# Patient Record
Sex: FEMALE | Race: OTHER | HISPANIC OR LATINO | Employment: UNEMPLOYED | URBAN - METROPOLITAN AREA
[De-identification: names, ages, dates, MRNs, and addresses within clinical notes are randomized per-mention and may not be internally consistent; named-entity substitution may affect disease eponyms.]

---

## 2017-07-17 ENCOUNTER — HOSPITAL ENCOUNTER (EMERGENCY)
Facility: HOSPITAL | Age: 47
End: 2017-07-18
Attending: EMERGENCY MEDICINE | Admitting: EMERGENCY MEDICINE
Payer: COMMERCIAL

## 2017-07-17 DIAGNOSIS — R45.851 SUICIDAL IDEATION: Primary | ICD-10-CM

## 2017-07-17 LAB — ETHANOL EXG-MCNC: 0 MG/DL

## 2017-07-17 PROCEDURE — 82075 ASSAY OF BREATH ETHANOL: CPT | Performed by: EMERGENCY MEDICINE

## 2017-07-17 PROCEDURE — 80307 DRUG TEST PRSMV CHEM ANLYZR: CPT | Performed by: EMERGENCY MEDICINE

## 2017-07-17 RX ORDER — NICOTINE 21 MG/24HR
21 PATCH, TRANSDERMAL 24 HOURS TRANSDERMAL ONCE
Status: DISCONTINUED | OUTPATIENT
Start: 2017-07-17 | End: 2017-07-18 | Stop reason: HOSPADM

## 2017-07-17 RX ADMIN — NICOTINE 21 MG: 21 PATCH, EXTENDED RELEASE TRANSDERMAL at 20:58

## 2017-07-18 VITALS
HEIGHT: 63 IN | DIASTOLIC BLOOD PRESSURE: 79 MMHG | BODY MASS INDEX: 24.8 KG/M2 | RESPIRATION RATE: 16 BRPM | HEART RATE: 62 BPM | TEMPERATURE: 98.5 F | OXYGEN SATURATION: 100 % | WEIGHT: 140 LBS | SYSTOLIC BLOOD PRESSURE: 122 MMHG

## 2017-07-18 LAB
AMPHETAMINES SERPL QL SCN: NEGATIVE
BARBITURATES UR QL: NEGATIVE
BENZODIAZ UR QL: NEGATIVE
COCAINE UR QL: NEGATIVE
HCG UR QL: NEGATIVE
METHADONE UR QL: NEGATIVE
OPIATES UR QL SCN: POSITIVE
PCP UR QL: NEGATIVE
THC UR QL: NEGATIVE

## 2017-07-18 PROCEDURE — 81025 URINE PREGNANCY TEST: CPT | Performed by: EMERGENCY MEDICINE

## 2017-07-18 PROCEDURE — 99285 EMERGENCY DEPT VISIT HI MDM: CPT

## 2018-01-01 ENCOUNTER — APPOINTMENT (EMERGENCY)
Dept: CT IMAGING | Facility: HOSPITAL | Age: 48
DRG: 816 | End: 2018-01-01
Payer: COMMERCIAL

## 2018-01-01 ENCOUNTER — APPOINTMENT (INPATIENT)
Dept: CT IMAGING | Facility: HOSPITAL | Age: 48
DRG: 816 | End: 2018-01-01
Payer: COMMERCIAL

## 2018-01-01 ENCOUNTER — APPOINTMENT (EMERGENCY)
Dept: RADIOLOGY | Facility: HOSPITAL | Age: 48
DRG: 816 | End: 2018-01-01
Payer: COMMERCIAL

## 2018-01-01 ENCOUNTER — APPOINTMENT (INPATIENT)
Dept: RADIOLOGY | Facility: HOSPITAL | Age: 48
DRG: 816 | End: 2018-01-01
Payer: COMMERCIAL

## 2018-01-01 ENCOUNTER — HOSPITAL ENCOUNTER (INPATIENT)
Facility: HOSPITAL | Age: 48
LOS: 10 days | Discharge: HOME | DRG: 753 | End: 2018-05-14
Attending: EMERGENCY MEDICINE | Admitting: PSYCHIATRY & NEUROLOGY
Payer: COMMERCIAL

## 2018-01-01 ENCOUNTER — HOSPITAL ENCOUNTER (INPATIENT)
Facility: HOSPITAL | Age: 48
LOS: 1 days | DRG: 816 | End: 2018-11-26
Attending: EMERGENCY MEDICINE | Admitting: INTERNAL MEDICINE
Payer: COMMERCIAL

## 2018-01-01 ENCOUNTER — APPOINTMENT (INPATIENT)
Dept: NEUROLOGY | Facility: HOSPITAL | Age: 48
DRG: 816 | End: 2018-01-01
Payer: COMMERCIAL

## 2018-01-01 VITALS
WEIGHT: 135 LBS | OXYGEN SATURATION: 96 % | RESPIRATION RATE: 16 BRPM | HEART RATE: 94 BPM | HEIGHT: 63 IN | TEMPERATURE: 98.1 F | DIASTOLIC BLOOD PRESSURE: 70 MMHG | SYSTOLIC BLOOD PRESSURE: 111 MMHG | BODY MASS INDEX: 23.92 KG/M2

## 2018-01-01 DIAGNOSIS — F32.9 MAJOR DEPRESSIVE DISORDER: Primary | ICD-10-CM

## 2018-01-01 DIAGNOSIS — K58.9 IBS (IRRITABLE BOWEL SYNDROME): ICD-10-CM

## 2018-01-01 DIAGNOSIS — F11.20 OPIOID TYPE DEPENDENCE, CONTINUOUS (HCC): Chronic | ICD-10-CM

## 2018-01-01 DIAGNOSIS — F41.9 ANXIETY: ICD-10-CM

## 2018-01-01 DIAGNOSIS — J30.9 ALLERGIC RHINITIS: ICD-10-CM

## 2018-01-01 DIAGNOSIS — F17.200 NICOTINE DEPENDENCE: ICD-10-CM

## 2018-01-01 DIAGNOSIS — D69.6 THROMBOCYTOPENIA (HCC): ICD-10-CM

## 2018-01-01 DIAGNOSIS — T50.901A DRUG OVERDOSE: ICD-10-CM

## 2018-01-01 DIAGNOSIS — B18.2 CHRONIC HEPATITIS C WITHOUT HEPATIC COMA (HCC): ICD-10-CM

## 2018-01-01 DIAGNOSIS — J96.01 ACUTE HYPOXEMIC RESPIRATORY FAILURE (HCC): ICD-10-CM

## 2018-01-01 DIAGNOSIS — R79.89 LOW T4: ICD-10-CM

## 2018-01-01 DIAGNOSIS — M62.838 MUSCLE SPASM: ICD-10-CM

## 2018-01-01 DIAGNOSIS — I46.9 CARDIAC ARREST (HCC): Primary | ICD-10-CM

## 2018-01-01 DIAGNOSIS — N17.9 AKI (ACUTE KIDNEY INJURY) (HCC): ICD-10-CM

## 2018-01-01 DIAGNOSIS — F31.63 BIPOLAR I DISORDER, MOST RECENT EPISODE MIXED, SEVERE WITHOUT PSYCHOTIC FEATURES (HCC): Primary | Chronic | ICD-10-CM

## 2018-01-01 DIAGNOSIS — F32.9 MAJOR DEPRESSIVE DISORDER: ICD-10-CM

## 2018-01-01 DIAGNOSIS — I10 HYPERTENSION: ICD-10-CM

## 2018-01-01 DIAGNOSIS — R45.851 SUICIDAL IDEATIONS: ICD-10-CM

## 2018-01-01 DIAGNOSIS — G47.00 INSOMNIA: ICD-10-CM

## 2018-01-01 LAB
ALBUMIN SERPL BCP-MCNC: 3 G/DL (ref 3.5–5)
ALBUMIN SERPL BCP-MCNC: 3.2 G/DL (ref 3.5–5)
ALP SERPL-CCNC: 81 U/L (ref 46–116)
ALP SERPL-CCNC: 89 U/L (ref 46–116)
ALT SERPL W P-5'-P-CCNC: 43 U/L (ref 12–78)
ALT SERPL W P-5'-P-CCNC: 51 U/L (ref 12–78)
AMPHETAMINES SERPL QL SCN: NEGATIVE
AMPHETAMINES SERPL QL SCN: NEGATIVE
ANION GAP BLD CALC-SCNC: 18 MMOL/L (ref 4–13)
ANION GAP SERPL CALCULATED.3IONS-SCNC: 14 MMOL/L (ref 4–13)
ANION GAP SERPL CALCULATED.3IONS-SCNC: 18 MMOL/L (ref 4–13)
ANION GAP SERPL CALCULATED.3IONS-SCNC: 3 MMOL/L (ref 4–13)
APAP SERPL-MCNC: <2 UG/ML (ref 10–30)
ARTERIAL PATENCY WRIST A: YES
AST SERPL W P-5'-P-CCNC: 39 U/L (ref 5–45)
AST SERPL W P-5'-P-CCNC: 41 U/L (ref 5–45)
BARBITURATES UR QL: NEGATIVE
BARBITURATES UR QL: NEGATIVE
BASE EXCESS BLDA CALC-SCNC: -12.3 MMOL/L
BASOPHILS # BLD AUTO: 0.01 THOUSANDS/ΜL (ref 0–0.1)
BASOPHILS # BLD AUTO: 0.01 THOUSANDS/ΜL (ref 0–0.1)
BASOPHILS # BLD AUTO: 0.02 THOUSANDS/ΜL (ref 0–0.1)
BASOPHILS # BLD AUTO: 0.06 THOUSANDS/ΜL (ref 0–0.1)
BASOPHILS NFR BLD AUTO: 0 % (ref 0–1)
BENZODIAZ UR QL: NEGATIVE
BENZODIAZ UR QL: NEGATIVE
BILIRUB SERPL-MCNC: 0.18 MG/DL (ref 0.2–1)
BILIRUB SERPL-MCNC: 0.21 MG/DL (ref 0.2–1)
BUN BLD-MCNC: 12 MG/DL (ref 5–25)
BUN SERPL-MCNC: 12 MG/DL (ref 5–25)
BUN SERPL-MCNC: 17 MG/DL (ref 5–25)
BUN SERPL-MCNC: 20 MG/DL (ref 5–25)
CA-I BLD-SCNC: 0.94 MMOL/L (ref 1.12–1.32)
CALCIUM SERPL-MCNC: 8.5 MG/DL (ref 8.3–10.1)
CALCIUM SERPL-MCNC: 8.5 MG/DL (ref 8.3–10.1)
CALCIUM SERPL-MCNC: 8.7 MG/DL (ref 8.3–10.1)
CHLORIDE BLD-SCNC: 110 MMOL/L (ref 100–108)
CHLORIDE SERPL-SCNC: 104 MMOL/L (ref 100–108)
CHOLEST SERPL-MCNC: 162 MG/DL (ref 50–200)
CO2 SERPL-SCNC: 22 MMOL/L (ref 21–32)
CO2 SERPL-SCNC: 23 MMOL/L (ref 21–32)
CO2 SERPL-SCNC: 30 MMOL/L (ref 21–32)
COCAINE UR QL: NEGATIVE
COCAINE UR QL: NEGATIVE
CREAT BLD-MCNC: 0.9 MG/DL (ref 0.6–1.3)
CREAT SERPL-MCNC: 1.03 MG/DL (ref 0.6–1.3)
CREAT SERPL-MCNC: 1.11 MG/DL (ref 0.6–1.3)
CREAT SERPL-MCNC: 1.48 MG/DL (ref 0.6–1.3)
EOSINOPHIL # BLD AUTO: 0 THOUSAND/ΜL (ref 0–0.61)
EOSINOPHIL # BLD AUTO: 0.08 THOUSAND/ΜL (ref 0–0.61)
EOSINOPHIL # BLD AUTO: 0.11 THOUSAND/ΜL (ref 0–0.61)
EOSINOPHIL # BLD AUTO: 0.13 THOUSAND/ΜL (ref 0–0.61)
EOSINOPHIL NFR BLD AUTO: 0 % (ref 0–6)
EOSINOPHIL NFR BLD AUTO: 1 % (ref 0–6)
EOSINOPHIL NFR BLD AUTO: 2 % (ref 0–6)
EOSINOPHIL NFR BLD AUTO: 3 % (ref 0–6)
ERYTHROCYTE [DISTWIDTH] IN BLOOD BY AUTOMATED COUNT: 12.4 % (ref 11.6–15.1)
ERYTHROCYTE [DISTWIDTH] IN BLOOD BY AUTOMATED COUNT: 13 % (ref 11.6–15.1)
ERYTHROCYTE [DISTWIDTH] IN BLOOD BY AUTOMATED COUNT: 13.2 % (ref 11.6–15.1)
ERYTHROCYTE [DISTWIDTH] IN BLOOD BY AUTOMATED COUNT: 13.4 % (ref 11.6–15.1)
EST. AVERAGE GLUCOSE BLD GHB EST-MCNC: 103 MG/DL
ETHANOL EXG-MCNC: 0 MG/DL
ETHANOL SERPL-MCNC: <3 MG/DL (ref 0–3)
EXT PREG TEST URINE: NORMAL
GFR SERPL CREATININE-BSD FRML MDRD: 20 ML/MIN/1.73SQ M
GFR SERPL CREATININE-BSD FRML MDRD: 37 ML/MIN/1.73SQ M
GFR SERPL CREATININE-BSD FRML MDRD: 59 ML/MIN/1.73SQ M
GFR SERPL CREATININE-BSD FRML MDRD: 64 ML/MIN/1.73SQ M
GLUCOSE P FAST SERPL-MCNC: 89 MG/DL (ref 65–99)
GLUCOSE SERPL-MCNC: 108 MG/DL (ref 65–140)
GLUCOSE SERPL-MCNC: 146 MG/DL (ref 65–140)
GLUCOSE SERPL-MCNC: 162 MG/DL (ref 65–140)
GLUCOSE SERPL-MCNC: 162 MG/DL (ref 65–140)
GLUCOSE SERPL-MCNC: 166 MG/DL (ref 65–140)
GLUCOSE SERPL-MCNC: 167 MG/DL (ref 65–140)
GLUCOSE SERPL-MCNC: 176 MG/DL (ref 65–140)
GLUCOSE SERPL-MCNC: 212 MG/DL (ref 65–140)
GLUCOSE SERPL-MCNC: 250 MG/DL (ref 65–140)
GLUCOSE SERPL-MCNC: 264 MG/DL (ref 65–140)
GLUCOSE SERPL-MCNC: 271 MG/DL (ref 65–140)
HBA1C MFR BLD: 5.2 % (ref 4.2–6.3)
HCG SERPL QL: NEGATIVE
HCO3 BLDA-SCNC: 15.8 MMOL/L (ref 22–28)
HCT VFR BLD AUTO: 37.2 % (ref 34.8–46.1)
HCT VFR BLD AUTO: 41.4 % (ref 34.8–46.1)
HCT VFR BLD AUTO: 41.9 % (ref 34.8–46.1)
HCT VFR BLD AUTO: 43 % (ref 34.8–46.1)
HCT VFR BLD CALC: 34 % (ref 34.8–46.1)
HDLC SERPL-MCNC: 61 MG/DL (ref 40–60)
HGB BLD-MCNC: 10.8 G/DL (ref 11.5–15.4)
HGB BLD-MCNC: 13.4 G/DL (ref 11.5–15.4)
HGB BLD-MCNC: 13.9 G/DL (ref 11.5–15.4)
HGB BLD-MCNC: 14.4 G/DL (ref 11.5–15.4)
HGB BLDA-MCNC: 11.6 G/DL (ref 11.5–15.4)
HOROWITZ INDEX BLDA+IHG-RTO: 100 MM[HG]
IMM GRANULOCYTES # BLD AUTO: 0.03 THOUSAND/UL (ref 0–0.2)
IMM GRANULOCYTES # BLD AUTO: 0.15 THOUSAND/UL (ref 0–0.2)
IMM GRANULOCYTES NFR BLD AUTO: 0 % (ref 0–2)
IMM GRANULOCYTES NFR BLD AUTO: 1 % (ref 0–2)
LACTATE SERPL-SCNC: 6.7 MMOL/L (ref 0.5–2)
LDLC SERPL CALC-MCNC: 64 MG/DL (ref 0–100)
LYMPHOCYTES # BLD AUTO: 1.8 THOUSANDS/ΜL (ref 0.6–4.47)
LYMPHOCYTES # BLD AUTO: 1.9 THOUSANDS/ΜL (ref 0.6–4.47)
LYMPHOCYTES # BLD AUTO: 2.79 THOUSANDS/ΜL (ref 0.6–4.47)
LYMPHOCYTES # BLD AUTO: 4.82 THOUSANDS/ΜL (ref 0.6–4.47)
LYMPHOCYTES NFR BLD AUTO: 11 % (ref 14–44)
LYMPHOCYTES NFR BLD AUTO: 44 % (ref 14–44)
LYMPHOCYTES NFR BLD AUTO: 46 % (ref 14–44)
LYMPHOCYTES NFR BLD AUTO: 51 % (ref 14–44)
MCH RBC QN AUTO: 28.3 PG (ref 26.8–34.3)
MCH RBC QN AUTO: 28.7 PG (ref 26.8–34.3)
MCH RBC QN AUTO: 29.4 PG (ref 26.8–34.3)
MCH RBC QN AUTO: 29.7 PG (ref 26.8–34.3)
MCHC RBC AUTO-ENTMCNC: 29 G/DL (ref 31.4–37.4)
MCHC RBC AUTO-ENTMCNC: 32.4 G/DL (ref 31.4–37.4)
MCHC RBC AUTO-ENTMCNC: 33.2 G/DL (ref 31.4–37.4)
MCHC RBC AUTO-ENTMCNC: 33.5 G/DL (ref 31.4–37.4)
MCV RBC AUTO: 86 FL (ref 82–98)
MCV RBC AUTO: 90 FL (ref 82–98)
MCV RBC AUTO: 91 FL (ref 82–98)
MCV RBC AUTO: 98 FL (ref 82–98)
METHADONE UR QL: NEGATIVE
METHADONE UR QL: POSITIVE
MONOCYTES # BLD AUTO: 0.25 THOUSAND/ΜL (ref 0.17–1.22)
MONOCYTES # BLD AUTO: 0.33 THOUSAND/ΜL (ref 0.17–1.22)
MONOCYTES # BLD AUTO: 0.53 THOUSAND/ΜL (ref 0.17–1.22)
MONOCYTES # BLD AUTO: 0.81 THOUSAND/ΜL (ref 0.17–1.22)
MONOCYTES NFR BLD AUTO: 3 % (ref 4–12)
MONOCYTES NFR BLD AUTO: 6 % (ref 4–12)
MONOCYTES NFR BLD AUTO: 6 % (ref 4–12)
MONOCYTES NFR BLD AUTO: 8 % (ref 4–12)
NEUTROPHILS # BLD AUTO: 1.84 THOUSANDS/ΜL (ref 1.85–7.62)
NEUTROPHILS # BLD AUTO: 1.89 THOUSANDS/ΜL (ref 1.85–7.62)
NEUTROPHILS # BLD AUTO: 22.13 THOUSANDS/ΜL (ref 1.85–7.62)
NEUTROPHILS # BLD AUTO: 3.92 THOUSANDS/ΜL (ref 1.85–7.62)
NEUTS SEG NFR BLD AUTO: 42 % (ref 43–75)
NEUTS SEG NFR BLD AUTO: 45 % (ref 43–75)
NEUTS SEG NFR BLD AUTO: 46 % (ref 43–75)
NEUTS SEG NFR BLD AUTO: 85 % (ref 43–75)
NONHDLC SERPL-MCNC: 101 MG/DL
NRBC BLD AUTO-RTO: 0 /100 WBCS
O2 CT BLDA-SCNC: 16.4 ML/DL (ref 16–23)
OPIATES UR QL SCN: NEGATIVE
OPIATES UR QL SCN: POSITIVE
OXYHGB MFR BLDA: 95.3 % (ref 94–97)
PCO2 BLD: 18 MMOL/L (ref 21–32)
PCO2 BLDA: 44.5 MM HG (ref 36–44)
PCP UR QL: NEGATIVE
PCP UR QL: NEGATIVE
PEEP RESPIRATORY: 5 CM[H2O]
PH BLDA: 7.17 [PH] (ref 7.35–7.45)
PLATELET # BLD AUTO: 112 THOUSANDS/UL (ref 149–390)
PLATELET # BLD AUTO: 126 THOUSANDS/UL (ref 149–390)
PLATELET # BLD AUTO: 175 THOUSANDS/UL (ref 149–390)
PLATELET # BLD AUTO: 283 THOUSANDS/UL (ref 149–390)
PLATELET # BLD AUTO: 287 THOUSANDS/UL (ref 149–390)
PMV BLD AUTO: 11.4 FL (ref 8.9–12.7)
PMV BLD AUTO: 11.6 FL (ref 8.9–12.7)
PMV BLD AUTO: 11.8 FL (ref 8.9–12.7)
PMV BLD AUTO: 12 FL (ref 8.9–12.7)
PMV BLD AUTO: 12.1 FL (ref 8.9–12.7)
PO2 BLDA: 275.7 MM HG (ref 75–129)
POTASSIUM BLD-SCNC: 5.9 MMOL/L (ref 3.5–5.3)
POTASSIUM SERPL-SCNC: 2.6 MMOL/L (ref 3.5–5.3)
POTASSIUM SERPL-SCNC: 4.1 MMOL/L (ref 3.5–5.3)
POTASSIUM SERPL-SCNC: 4.3 MMOL/L (ref 3.5–5.3)
PROCALCITONIN SERPL-MCNC: 17.5 NG/ML
PROT SERPL-MCNC: 6.2 G/DL (ref 6.4–8.2)
PROT SERPL-MCNC: 7.3 G/DL (ref 6.4–8.2)
RBC # BLD AUTO: 3.81 MILLION/UL (ref 3.81–5.12)
RBC # BLD AUTO: 4.56 MILLION/UL (ref 3.81–5.12)
RBC # BLD AUTO: 4.68 MILLION/UL (ref 3.81–5.12)
RBC # BLD AUTO: 5.02 MILLION/UL (ref 3.81–5.12)
RPR SER QL: NORMAL
SALICYLATES SERPL-MCNC: 3.2 MG/DL (ref 3–20)
SODIUM BLD-SCNC: 139 MMOL/L (ref 136–145)
SODIUM SERPL-SCNC: 137 MMOL/L (ref 136–145)
SODIUM SERPL-SCNC: 141 MMOL/L (ref 136–145)
SODIUM SERPL-SCNC: 144 MMOL/L (ref 136–145)
SPECIMEN SOURCE: ABNORMAL
SPECIMEN SOURCE: ABNORMAL
T3FREE SERPL-MCNC: 1.98 PG/ML (ref 2.3–4.2)
T3FREE SERPL-MCNC: 2.28 PG/ML (ref 2.3–4.2)
T4 FREE SERPL-MCNC: 0.66 NG/DL (ref 0.76–1.46)
T4 FREE SERPL-MCNC: 0.7 NG/DL (ref 0.76–1.46)
T4 FREE SERPL-MCNC: 1.51 NG/DL (ref 0.76–1.46)
THC UR QL: NEGATIVE
THC UR QL: NEGATIVE
TRIGL SERPL-MCNC: 187 MG/DL
TROPONIN I SERPL-MCNC: <0.02 NG/ML
TSH SERPL DL<=0.05 MIU/L-ACNC: 0.28 UIU/ML (ref 0.36–3.74)
TSH SERPL DL<=0.05 MIU/L-ACNC: 0.3 UIU/ML (ref 0.36–3.74)
TSH SERPL DL<=0.05 MIU/L-ACNC: 1.06 UIU/ML (ref 0.36–3.74)
TSH SERPL DL<=0.05 MIU/L-ACNC: 1.69 UIU/ML (ref 0.36–3.74)
VENT AC: 22
VENT- AC: AC
VT SETTING VENT: 450 ML
WBC # BLD AUTO: 25.94 THOUSAND/UL (ref 4.31–10.16)
WBC # BLD AUTO: 4.09 THOUSAND/UL (ref 4.31–10.16)
WBC # BLD AUTO: 4.13 THOUSAND/UL (ref 4.31–10.16)
WBC # BLD AUTO: 9.45 THOUSAND/UL (ref 4.31–10.16)

## 2018-01-01 PROCEDURE — 84443 ASSAY THYROID STIM HORMONE: CPT | Performed by: NURSE PRACTITIONER

## 2018-01-01 PROCEDURE — 80047 BASIC METABLC PNL IONIZED CA: CPT

## 2018-01-01 PROCEDURE — 99232 SBSQ HOSP IP/OBS MODERATE 35: CPT | Performed by: PSYCHIATRY & NEUROLOGY

## 2018-01-01 PROCEDURE — 84439 ASSAY OF FREE THYROXINE: CPT | Performed by: PHYSICIAN ASSISTANT

## 2018-01-01 PROCEDURE — 70450 CT HEAD/BRAIN W/O DYE: CPT

## 2018-01-01 PROCEDURE — 85014 HEMATOCRIT: CPT

## 2018-01-01 PROCEDURE — 84481 FREE ASSAY (FT-3): CPT | Performed by: PSYCHIATRY & NEUROLOGY

## 2018-01-01 PROCEDURE — 85025 COMPLETE CBC W/AUTO DIFF WBC: CPT | Performed by: NURSE PRACTITIONER

## 2018-01-01 PROCEDURE — 71045 X-RAY EXAM CHEST 1 VIEW: CPT

## 2018-01-01 PROCEDURE — 82948 REAGENT STRIP/BLOOD GLUCOSE: CPT

## 2018-01-01 PROCEDURE — 93005 ELECTROCARDIOGRAM TRACING: CPT

## 2018-01-01 PROCEDURE — 80307 DRUG TEST PRSMV CHEM ANLYZR: CPT | Performed by: EMERGENCY MEDICINE

## 2018-01-01 PROCEDURE — 80048 BASIC METABOLIC PNL TOTAL CA: CPT | Performed by: NURSE PRACTITIONER

## 2018-01-01 PROCEDURE — 99233 SBSQ HOSP IP/OBS HIGH 50: CPT | Performed by: PSYCHIATRY & NEUROLOGY

## 2018-01-01 PROCEDURE — 80061 LIPID PANEL: CPT | Performed by: PSYCHIATRY & NEUROLOGY

## 2018-01-01 PROCEDURE — 85025 COMPLETE CBC W/AUTO DIFF WBC: CPT | Performed by: PSYCHIATRY & NEUROLOGY

## 2018-01-01 PROCEDURE — 83036 HEMOGLOBIN GLYCOSYLATED A1C: CPT | Performed by: NURSE PRACTITIONER

## 2018-01-01 PROCEDURE — 85049 AUTOMATED PLATELET COUNT: CPT | Performed by: NURSE PRACTITIONER

## 2018-01-01 PROCEDURE — 87040 BLOOD CULTURE FOR BACTERIA: CPT | Performed by: NURSE PRACTITIONER

## 2018-01-01 PROCEDURE — 80320 DRUG SCREEN QUANTALCOHOLS: CPT | Performed by: EMERGENCY MEDICINE

## 2018-01-01 PROCEDURE — 82075 ASSAY OF BREATH ETHANOL: CPT | Performed by: EMERGENCY MEDICINE

## 2018-01-01 PROCEDURE — 99238 HOSP IP/OBS DSCHRG MGMT 30/<: CPT

## 2018-01-01 PROCEDURE — 80053 COMPREHEN METABOLIC PANEL: CPT | Performed by: EMERGENCY MEDICINE

## 2018-01-01 PROCEDURE — 99239 HOSP IP/OBS DSCHRG MGMT >30: CPT | Performed by: PSYCHIATRY & NEUROLOGY

## 2018-01-01 PROCEDURE — 84439 ASSAY OF FREE THYROXINE: CPT | Performed by: PSYCHIATRY & NEUROLOGY

## 2018-01-01 PROCEDURE — 96365 THER/PROPH/DIAG IV INF INIT: CPT

## 2018-01-01 PROCEDURE — 95822 EEG COMA OR SLEEP ONLY: CPT | Performed by: PSYCHIATRY & NEUROLOGY

## 2018-01-01 PROCEDURE — 94002 VENT MGMT INPAT INIT DAY: CPT

## 2018-01-01 PROCEDURE — 96375 TX/PRO/DX INJ NEW DRUG ADDON: CPT

## 2018-01-01 PROCEDURE — 84145 PROCALCITONIN (PCT): CPT | Performed by: NURSE PRACTITIONER

## 2018-01-01 PROCEDURE — 82805 BLOOD GASES W/O2 SATURATION: CPT | Performed by: EMERGENCY MEDICINE

## 2018-01-01 PROCEDURE — 02HV33Z INSERTION OF INFUSION DEVICE INTO SUPERIOR VENA CAVA, PERCUTANEOUS APPROACH: ICD-10-PCS | Performed by: EMERGENCY MEDICINE

## 2018-01-01 PROCEDURE — 83605 ASSAY OF LACTIC ACID: CPT | Performed by: NURSE PRACTITIONER

## 2018-01-01 PROCEDURE — 99291 CRITICAL CARE FIRST HOUR: CPT

## 2018-01-01 PROCEDURE — 99223 1ST HOSP IP/OBS HIGH 75: CPT | Performed by: PSYCHIATRY & NEUROLOGY

## 2018-01-01 PROCEDURE — 80053 COMPREHEN METABOLIC PANEL: CPT | Performed by: PSYCHIATRY & NEUROLOGY

## 2018-01-01 PROCEDURE — 94003 VENT MGMT INPAT SUBQ DAY: CPT

## 2018-01-01 PROCEDURE — 86592 SYPHILIS TEST NON-TREP QUAL: CPT | Performed by: PSYCHIATRY & NEUROLOGY

## 2018-01-01 PROCEDURE — 99356 PR PROLONGED SVC I/P OR OBS SETTING 1ST HOUR: CPT | Performed by: PSYCHIATRY & NEUROLOGY

## 2018-01-01 PROCEDURE — 84443 ASSAY THYROID STIM HORMONE: CPT | Performed by: PSYCHIATRY & NEUROLOGY

## 2018-01-01 PROCEDURE — 99285 EMERGENCY DEPT VISIT HI MDM: CPT

## 2018-01-01 PROCEDURE — 81002 URINALYSIS NONAUTO W/O SCOPE: CPT | Performed by: EMERGENCY MEDICINE

## 2018-01-01 PROCEDURE — 5A1935Z RESPIRATORY VENTILATION, LESS THAN 24 CONSECUTIVE HOURS: ICD-10-PCS | Performed by: EMERGENCY MEDICINE

## 2018-01-01 PROCEDURE — 92950 HEART/LUNG RESUSCITATION CPR: CPT

## 2018-01-01 PROCEDURE — 84481 FREE ASSAY (FT-3): CPT | Performed by: PHYSICIAN ASSISTANT

## 2018-01-01 PROCEDURE — 84703 CHORIONIC GONADOTROPIN ASSAY: CPT | Performed by: PSYCHIATRY & NEUROLOGY

## 2018-01-01 PROCEDURE — 84439 ASSAY OF FREE THYROXINE: CPT | Performed by: NURSE PRACTITIONER

## 2018-01-01 PROCEDURE — 81025 URINE PREGNANCY TEST: CPT | Performed by: EMERGENCY MEDICINE

## 2018-01-01 PROCEDURE — 84443 ASSAY THYROID STIM HORMONE: CPT | Performed by: PHYSICIAN ASSISTANT

## 2018-01-01 PROCEDURE — 84484 ASSAY OF TROPONIN QUANT: CPT | Performed by: EMERGENCY MEDICINE

## 2018-01-01 PROCEDURE — 85025 COMPLETE CBC W/AUTO DIFF WBC: CPT | Performed by: EMERGENCY MEDICINE

## 2018-01-01 PROCEDURE — 99252 IP/OBS CONSLTJ NEW/EST SF 35: CPT | Performed by: PHYSICIAN ASSISTANT

## 2018-01-01 PROCEDURE — 36415 COLL VENOUS BLD VENIPUNCTURE: CPT | Performed by: EMERGENCY MEDICINE

## 2018-01-01 PROCEDURE — 82947 ASSAY GLUCOSE BLOOD QUANT: CPT | Performed by: PSYCHIATRY & NEUROLOGY

## 2018-01-01 PROCEDURE — 80329 ANALGESICS NON-OPIOID 1 OR 2: CPT | Performed by: EMERGENCY MEDICINE

## 2018-01-01 PROCEDURE — 99231 SBSQ HOSP IP/OBS SF/LOW 25: CPT | Performed by: PSYCHIATRY & NEUROLOGY

## 2018-01-01 PROCEDURE — 99291 CRITICAL CARE FIRST HOUR: CPT | Performed by: INTERNAL MEDICINE

## 2018-01-01 PROCEDURE — 95816 EEG AWAKE AND DROWSY: CPT

## 2018-01-01 RX ORDER — METHADONE HYDROCHLORIDE 10 MG/1
10 TABLET ORAL DAILY
Status: COMPLETED | OUTPATIENT
Start: 2018-01-01 | End: 2018-01-01

## 2018-01-01 RX ORDER — ACETAMINOPHEN 160 MG/5ML
650 SUSPENSION, ORAL (FINAL DOSE FORM) ORAL EVERY 6 HOURS PRN
Status: DISCONTINUED | OUTPATIENT
Start: 2018-01-01 | End: 2018-01-01

## 2018-01-01 RX ORDER — LAMOTRIGINE 25 MG/1
25 TABLET ORAL DAILY
Status: DISCONTINUED | OUTPATIENT
Start: 2018-01-01 | End: 2018-01-01

## 2018-01-01 RX ORDER — HEPARIN SODIUM 5000 [USP'U]/ML
5000 INJECTION, SOLUTION INTRAVENOUS; SUBCUTANEOUS EVERY 8 HOURS SCHEDULED
Status: DISCONTINUED | OUTPATIENT
Start: 2018-01-01 | End: 2018-01-01 | Stop reason: ALTCHOICE

## 2018-01-01 RX ORDER — NICOTINE 21 MG/24HR
1 PATCH, TRANSDERMAL 24 HOURS TRANSDERMAL DAILY
Qty: 21 PATCH | Refills: 0 | Status: SHIPPED | OUTPATIENT
Start: 2018-01-01 | End: 2018-01-01

## 2018-01-01 RX ORDER — NICOTINE 21 MG/24HR
21 PATCH, TRANSDERMAL 24 HOURS TRANSDERMAL DAILY
Status: DISCONTINUED | OUTPATIENT
Start: 2018-01-01 | End: 2018-01-01 | Stop reason: HOSPADM

## 2018-01-01 RX ORDER — HALOPERIDOL 5 MG
5 TABLET ORAL EVERY 6 HOURS PRN
Status: DISCONTINUED | OUTPATIENT
Start: 2018-01-01 | End: 2018-01-01 | Stop reason: HOSPADM

## 2018-01-01 RX ORDER — HALOPERIDOL 5 MG
5 TABLET ORAL EVERY 6 HOURS PRN
Status: DISCONTINUED | OUTPATIENT
Start: 2018-01-01 | End: 2018-01-01

## 2018-01-01 RX ORDER — OLANZAPINE 10 MG/1
10 INJECTION, POWDER, LYOPHILIZED, FOR SOLUTION INTRAMUSCULAR
Status: DISCONTINUED | OUTPATIENT
Start: 2018-01-01 | End: 2018-01-01 | Stop reason: HOSPADM

## 2018-01-01 RX ORDER — DOCUSATE SODIUM 100 MG/1
100 CAPSULE, LIQUID FILLED ORAL
Status: DISCONTINUED | OUTPATIENT
Start: 2018-01-01 | End: 2018-01-01 | Stop reason: HOSPADM

## 2018-01-01 RX ORDER — POTASSIUM CHLORIDE 20MEQ/15ML
40 LIQUID (ML) ORAL ONCE
Status: COMPLETED | OUTPATIENT
Start: 2018-01-01 | End: 2018-01-01

## 2018-01-01 RX ORDER — ACETAMINOPHEN 325 MG/1
650 TABLET ORAL EVERY 6 HOURS PRN
Status: CANCELLED | OUTPATIENT
Start: 2018-01-01

## 2018-01-01 RX ORDER — POTASSIUM CHLORIDE 29.8 MG/ML
40 INJECTION INTRAVENOUS ONCE
Status: COMPLETED | OUTPATIENT
Start: 2018-01-01 | End: 2018-01-01

## 2018-01-01 RX ORDER — LANOLIN ALCOHOL/MO/W.PET/CERES
6 CREAM (GRAM) TOPICAL
Status: DISCONTINUED | OUTPATIENT
Start: 2018-01-01 | End: 2018-01-01

## 2018-01-01 RX ORDER — BENZTROPINE MESYLATE 1 MG/ML
1 INJECTION INTRAMUSCULAR; INTRAVENOUS EVERY 6 HOURS PRN
Status: DISCONTINUED | OUTPATIENT
Start: 2018-01-01 | End: 2018-01-01 | Stop reason: HOSPADM

## 2018-01-01 RX ORDER — BENZTROPINE MESYLATE 1 MG/1
1 TABLET ORAL EVERY 6 HOURS PRN
Status: DISCONTINUED | OUTPATIENT
Start: 2018-01-01 | End: 2018-01-01 | Stop reason: HOSPADM

## 2018-01-01 RX ORDER — ATROPINE SULFATE 10 MG/ML
1 SOLUTION/ DROPS OPHTHALMIC 3 TIMES DAILY
Status: DISCONTINUED | OUTPATIENT
Start: 2018-01-01 | End: 2018-11-27 | Stop reason: HOSPADM

## 2018-01-01 RX ORDER — SODIUM BICARBONATE 84 MG/ML
INJECTION, SOLUTION INTRAVENOUS CODE/TRAUMA/SEDATION MEDICATION
Status: COMPLETED | OUTPATIENT
Start: 2018-01-01 | End: 2018-01-01

## 2018-01-01 RX ORDER — RISPERIDONE 1 MG/1
1 TABLET, ORALLY DISINTEGRATING ORAL EVERY 4 HOURS PRN
Status: DISCONTINUED | OUTPATIENT
Start: 2018-01-01 | End: 2018-01-01 | Stop reason: HOSPADM

## 2018-01-01 RX ORDER — FLUTICASONE PROPIONATE 50 MCG
1 SPRAY, SUSPENSION (ML) NASAL DAILY
Status: DISCONTINUED | OUTPATIENT
Start: 2018-01-01 | End: 2018-01-01 | Stop reason: HOSPADM

## 2018-01-01 RX ORDER — LORAZEPAM 2 MG/ML
1 INJECTION INTRAMUSCULAR EVERY 6 HOURS PRN
Status: DISCONTINUED | OUTPATIENT
Start: 2018-01-01 | End: 2018-01-01 | Stop reason: HOSPADM

## 2018-01-01 RX ORDER — TRAZODONE HYDROCHLORIDE 100 MG/1
100 TABLET ORAL
Status: DISCONTINUED | OUTPATIENT
Start: 2018-01-01 | End: 2018-01-01

## 2018-01-01 RX ORDER — SODIUM CHLORIDE 9 MG/ML
10 INJECTION, SOLUTION INTRAVENOUS CONTINUOUS
Status: DISCONTINUED | OUTPATIENT
Start: 2018-01-01 | End: 2018-11-27 | Stop reason: HOSPADM

## 2018-01-01 RX ORDER — QUETIAPINE FUMARATE 200 MG/1
400 TABLET, FILM COATED ORAL
Status: DISCONTINUED | OUTPATIENT
Start: 2018-01-01 | End: 2018-01-01 | Stop reason: HOSPADM

## 2018-01-01 RX ORDER — METHADONE HYDROCHLORIDE 10 MG/1
20 TABLET ORAL DAILY
Status: COMPLETED | OUTPATIENT
Start: 2018-01-01 | End: 2018-01-01

## 2018-01-01 RX ORDER — SODIUM CHLORIDE, SODIUM LACTATE, POTASSIUM CHLORIDE, CALCIUM CHLORIDE 600; 310; 30; 20 MG/100ML; MG/100ML; MG/100ML; MG/100ML
150 INJECTION, SOLUTION INTRAVENOUS CONTINUOUS
Status: DISCONTINUED | OUTPATIENT
Start: 2018-01-01 | End: 2018-01-01

## 2018-01-01 RX ORDER — TRAZODONE HYDROCHLORIDE 100 MG/1
200 TABLET ORAL
Status: DISCONTINUED | OUTPATIENT
Start: 2018-01-01 | End: 2018-01-01

## 2018-01-01 RX ORDER — CHLORHEXIDINE GLUCONATE 0.12 MG/ML
15 RINSE ORAL EVERY 12 HOURS SCHEDULED
Status: DISCONTINUED | OUTPATIENT
Start: 2018-01-01 | End: 2018-01-01 | Stop reason: ALTCHOICE

## 2018-01-01 RX ORDER — GABAPENTIN 300 MG/1
300 CAPSULE ORAL 3 TIMES DAILY
Status: CANCELLED | OUTPATIENT
Start: 2018-01-01

## 2018-01-01 RX ORDER — IBUPROFEN 600 MG/1
600 TABLET ORAL EVERY 6 HOURS PRN
Status: DISCONTINUED | OUTPATIENT
Start: 2018-01-01 | End: 2018-01-01 | Stop reason: HOSPADM

## 2018-01-01 RX ORDER — ACETAMINOPHEN 325 MG/1
650 TABLET ORAL 4 TIMES DAILY PRN
Status: DISCONTINUED | OUTPATIENT
Start: 2018-01-01 | End: 2018-01-01

## 2018-01-01 RX ORDER — LAMOTRIGINE 100 MG/1
50 TABLET ORAL DAILY
Qty: 30 TABLET | Refills: 1 | Status: SHIPPED | OUTPATIENT
Start: 2018-01-01 | End: 2018-01-01

## 2018-01-01 RX ORDER — GABAPENTIN 300 MG/1
300 CAPSULE ORAL 3 TIMES DAILY
Status: DISCONTINUED | OUTPATIENT
Start: 2018-01-01 | End: 2018-01-01 | Stop reason: HOSPADM

## 2018-01-01 RX ORDER — HYDROXYZINE HYDROCHLORIDE 25 MG/1
25 TABLET, FILM COATED ORAL EVERY 4 HOURS PRN
Status: DISCONTINUED | OUTPATIENT
Start: 2018-01-01 | End: 2018-01-01

## 2018-01-01 RX ORDER — ACETAMINOPHEN 325 MG/1
975 TABLET ORAL EVERY 6 HOURS PRN
Status: DISCONTINUED | OUTPATIENT
Start: 2018-01-01 | End: 2018-01-01 | Stop reason: HOSPADM

## 2018-01-01 RX ORDER — METHADONE HYDROCHLORIDE 10 MG/1
50 TABLET ORAL DAILY
Status: COMPLETED | OUTPATIENT
Start: 2018-01-01 | End: 2018-01-01

## 2018-01-01 RX ORDER — ACETAMINOPHEN 325 MG/1
650 TABLET ORAL EVERY 6 HOURS PRN
Status: DISCONTINUED | OUTPATIENT
Start: 2018-01-01 | End: 2018-01-01 | Stop reason: HOSPADM

## 2018-01-01 RX ORDER — CLONIDINE HYDROCHLORIDE 0.1 MG/1
0.1 TABLET ORAL EVERY 6 HOURS PRN
Status: DISCONTINUED | OUTPATIENT
Start: 2018-01-01 | End: 2018-01-01 | Stop reason: HOSPADM

## 2018-01-01 RX ORDER — LAMOTRIGINE 25 MG/1
50 TABLET ORAL DAILY
Status: DISCONTINUED | OUTPATIENT
Start: 2018-01-01 | End: 2018-01-01 | Stop reason: HOSPADM

## 2018-01-01 RX ORDER — CARBAMAZEPINE 200 MG/1
200 TABLET, EXTENDED RELEASE ORAL
Status: DISCONTINUED | OUTPATIENT
Start: 2018-01-01 | End: 2018-01-01

## 2018-01-01 RX ORDER — METHADONE HYDROCHLORIDE 10 MG/1
70 TABLET ORAL DAILY
Status: COMPLETED | OUTPATIENT
Start: 2018-01-01 | End: 2018-01-01

## 2018-01-01 RX ORDER — AMLODIPINE BESYLATE 5 MG/1
5 TABLET ORAL DAILY
Qty: 30 TABLET | Refills: 0 | Status: SHIPPED | OUTPATIENT
Start: 2018-01-01 | End: 2018-01-01

## 2018-01-01 RX ORDER — HALOPERIDOL 5 MG/ML
5 INJECTION INTRAMUSCULAR EVERY 6 HOURS PRN
Status: DISCONTINUED | OUTPATIENT
Start: 2018-01-01 | End: 2018-01-01 | Stop reason: HOSPADM

## 2018-01-01 RX ORDER — CYCLOBENZAPRINE HCL 10 MG
10 TABLET ORAL 3 TIMES DAILY PRN
Status: DISCONTINUED | OUTPATIENT
Start: 2018-01-01 | End: 2018-01-01 | Stop reason: HOSPADM

## 2018-01-01 RX ORDER — METHADONE HYDROCHLORIDE 10 MG/1
40 TABLET ORAL DAILY
Status: COMPLETED | OUTPATIENT
Start: 2018-01-01 | End: 2018-01-01

## 2018-01-01 RX ORDER — METHADONE HYDROCHLORIDE 10 MG/1
30 TABLET ORAL DAILY
Status: COMPLETED | OUTPATIENT
Start: 2018-01-01 | End: 2018-01-01

## 2018-01-01 RX ORDER — ACETAMINOPHEN 325 MG/1
650 TABLET ORAL EVERY 4 HOURS PRN
Status: DISCONTINUED | OUTPATIENT
Start: 2018-01-01 | End: 2018-01-01

## 2018-01-01 RX ORDER — AMLODIPINE BESYLATE 5 MG/1
5 TABLET ORAL DAILY
COMMUNITY
End: 2018-01-01 | Stop reason: HOSPADM

## 2018-01-01 RX ORDER — MAGNESIUM HYDROXIDE/ALUMINUM HYDROXICE/SIMETHICONE 120; 1200; 1200 MG/30ML; MG/30ML; MG/30ML
30 SUSPENSION ORAL EVERY 4 HOURS PRN
Status: CANCELLED | OUTPATIENT
Start: 2018-01-01

## 2018-01-01 RX ORDER — NAPROXEN 250 MG/1
250 TABLET ORAL 2 TIMES DAILY WITH MEALS
Status: CANCELLED | OUTPATIENT
Start: 2018-01-01

## 2018-01-01 RX ORDER — CEFEPIME HYDROCHLORIDE 1 G/1
1000 INJECTION, POWDER, FOR SOLUTION INTRAMUSCULAR; INTRAVENOUS EVERY 12 HOURS
Status: DISCONTINUED | OUTPATIENT
Start: 2018-01-01 | End: 2018-01-01 | Stop reason: SDUPTHER

## 2018-01-01 RX ORDER — ACETAMINOPHEN 160 MG/5ML
650 SUSPENSION, ORAL (FINAL DOSE FORM) ORAL EVERY 6 HOURS
Status: DISCONTINUED | OUTPATIENT
Start: 2018-01-01 | End: 2018-01-01 | Stop reason: ALTCHOICE

## 2018-01-01 RX ORDER — HALOPERIDOL 5 MG
5 TABLET ORAL EVERY 6 HOURS PRN
Status: CANCELLED | OUTPATIENT
Start: 2018-01-01

## 2018-01-01 RX ORDER — QUETIAPINE FUMARATE 300 MG/1
300 TABLET, FILM COATED ORAL
Status: DISCONTINUED | OUTPATIENT
Start: 2018-01-01 | End: 2018-01-01

## 2018-01-01 RX ORDER — LORAZEPAM 0.5 MG/1
0.5 TABLET ORAL 2 TIMES DAILY
Status: COMPLETED | OUTPATIENT
Start: 2018-01-01 | End: 2018-01-01

## 2018-01-01 RX ORDER — BENZTROPINE MESYLATE 1 MG/ML
1 INJECTION INTRAMUSCULAR; INTRAVENOUS EVERY 6 HOURS PRN
Status: CANCELLED | OUTPATIENT
Start: 2018-01-01

## 2018-01-01 RX ORDER — HYDROXYZINE HYDROCHLORIDE 25 MG/1
25 TABLET, FILM COATED ORAL EVERY 4 HOURS PRN
Status: CANCELLED | OUTPATIENT
Start: 2018-01-01

## 2018-01-01 RX ORDER — LOPERAMIDE HYDROCHLORIDE 2 MG/1
2 CAPSULE ORAL EVERY 4 HOURS PRN
Status: DISCONTINUED | OUTPATIENT
Start: 2018-01-01 | End: 2018-01-01 | Stop reason: HOSPADM

## 2018-01-01 RX ORDER — CARBAMAZEPINE 200 MG/1
200 TABLET, EXTENDED RELEASE ORAL
Status: CANCELLED | OUTPATIENT
Start: 2018-01-01

## 2018-01-01 RX ORDER — QUETIAPINE FUMARATE 100 MG/1
100 TABLET, FILM COATED ORAL
Status: DISCONTINUED | OUTPATIENT
Start: 2018-01-01 | End: 2018-01-01

## 2018-01-01 RX ORDER — FLUTICASONE PROPIONATE 50 MCG
1 SPRAY, SUSPENSION (ML) NASAL DAILY
Qty: 16 G | Refills: 0 | Status: SHIPPED | OUTPATIENT
Start: 2018-01-01 | End: 2018-01-01

## 2018-01-01 RX ORDER — ACETAMINOPHEN 325 MG/1
650 TABLET ORAL EVERY 6 HOURS PRN
Status: DISCONTINUED | OUTPATIENT
Start: 2018-01-01 | End: 2018-01-01

## 2018-01-01 RX ORDER — CLONIDINE HYDROCHLORIDE 0.1 MG/1
0.1 TABLET ORAL 2 TIMES DAILY
Status: DISPENSED | OUTPATIENT
Start: 2018-01-01 | End: 2018-01-01

## 2018-01-01 RX ORDER — HALOPERIDOL 5 MG/ML
5 INJECTION INTRAMUSCULAR EVERY 6 HOURS PRN
Status: CANCELLED | OUTPATIENT
Start: 2018-01-01

## 2018-01-01 RX ORDER — TRAZODONE HYDROCHLORIDE 100 MG/1
200 TABLET ORAL
Status: CANCELLED | OUTPATIENT
Start: 2018-01-01

## 2018-01-01 RX ORDER — DICYCLOMINE HYDROCHLORIDE 10 MG/1
10 CAPSULE ORAL EVERY 6 HOURS PRN
Status: DISCONTINUED | OUTPATIENT
Start: 2018-01-01 | End: 2018-01-01 | Stop reason: HOSPADM

## 2018-01-01 RX ORDER — LORAZEPAM 0.5 MG/1
0.5 TABLET ORAL 3 TIMES DAILY
Status: DISCONTINUED | OUTPATIENT
Start: 2018-01-01 | End: 2018-01-01

## 2018-01-01 RX ORDER — NALOXONE HYDROCHLORIDE 1 MG/ML
INJECTION PARENTERAL CODE/TRAUMA/SEDATION MEDICATION
Status: COMPLETED | OUTPATIENT
Start: 2018-01-01 | End: 2018-01-01

## 2018-01-01 RX ORDER — CARBAMAZEPINE 100 MG/1
100 TABLET, EXTENDED RELEASE ORAL DAILY
Status: DISCONTINUED | OUTPATIENT
Start: 2018-01-01 | End: 2018-01-01

## 2018-01-01 RX ORDER — CARBAMAZEPINE 100 MG/1
100 TABLET, EXTENDED RELEASE ORAL DAILY
Status: CANCELLED | OUTPATIENT
Start: 2018-01-01

## 2018-01-01 RX ORDER — METHADONE HYDROCHLORIDE 5 MG/1
TABLET ORAL EVERY 6 HOURS PRN
COMMUNITY
End: 2018-01-01 | Stop reason: HOSPADM

## 2018-01-01 RX ORDER — HYDROXYZINE HYDROCHLORIDE 25 MG/1
25 TABLET, FILM COATED ORAL 3 TIMES DAILY
Status: DISCONTINUED | OUTPATIENT
Start: 2018-01-01 | End: 2018-01-01

## 2018-01-01 RX ORDER — FLUTICASONE PROPIONATE 50 MCG
1 SPRAY, SUSPENSION (ML) NASAL DAILY
COMMUNITY
End: 2018-01-01 | Stop reason: HOSPADM

## 2018-01-01 RX ORDER — HALOPERIDOL 5 MG/ML
5 INJECTION INTRAMUSCULAR EVERY 6 HOURS PRN
Status: DISCONTINUED | OUTPATIENT
Start: 2018-01-01 | End: 2018-01-01

## 2018-01-01 RX ORDER — HYDROXYZINE 50 MG/1
50 TABLET, FILM COATED ORAL EVERY 6 HOURS PRN
Status: DISCONTINUED | OUTPATIENT
Start: 2018-01-01 | End: 2018-01-01 | Stop reason: HOSPADM

## 2018-01-01 RX ORDER — METHADONE HYDROCHLORIDE 10 MG/1
60 TABLET ORAL DAILY
Status: COMPLETED | OUTPATIENT
Start: 2018-01-01 | End: 2018-01-01

## 2018-01-01 RX ORDER — METHADONE HYDROCHLORIDE 10 MG/1
5 TABLET ORAL ONCE
Status: COMPLETED | OUTPATIENT
Start: 2018-01-01 | End: 2018-01-01

## 2018-01-01 RX ORDER — BENZTROPINE MESYLATE 1 MG/1
1 TABLET ORAL EVERY 6 HOURS PRN
Status: CANCELLED | OUTPATIENT
Start: 2018-01-01

## 2018-01-01 RX ORDER — GABAPENTIN 300 MG/1
300 CAPSULE ORAL 3 TIMES DAILY
Qty: 90 CAPSULE | Refills: 1 | Status: SHIPPED | OUTPATIENT
Start: 2018-01-01 | End: 2018-01-01

## 2018-01-01 RX ORDER — BENZTROPINE MESYLATE 1 MG/1
1 TABLET ORAL EVERY 6 HOURS PRN
Status: DISCONTINUED | OUTPATIENT
Start: 2018-01-01 | End: 2018-01-01

## 2018-01-01 RX ORDER — CYCLOBENZAPRINE HCL 10 MG
10 TABLET ORAL 3 TIMES DAILY PRN
Status: CANCELLED | OUTPATIENT
Start: 2018-01-01

## 2018-01-01 RX ORDER — CLONIDINE HYDROCHLORIDE 0.1 MG/1
0.1 TABLET ORAL 3 TIMES DAILY
Status: DISPENSED | OUTPATIENT
Start: 2018-01-01 | End: 2018-01-01

## 2018-01-01 RX ORDER — BENZTROPINE MESYLATE 1 MG/ML
1 INJECTION INTRAMUSCULAR; INTRAVENOUS EVERY 6 HOURS PRN
Status: DISCONTINUED | OUTPATIENT
Start: 2018-01-01 | End: 2018-01-01

## 2018-01-01 RX ORDER — TRAZODONE HYDROCHLORIDE 150 MG/1
150 TABLET ORAL
Qty: 30 TABLET | Refills: 1 | Status: SHIPPED | OUTPATIENT
Start: 2018-01-01 | End: 2018-01-01

## 2018-01-01 RX ORDER — METHADONE HYDROCHLORIDE 10 MG/1
80 TABLET ORAL ONCE
Status: COMPLETED | OUTPATIENT
Start: 2018-01-01 | End: 2018-01-01

## 2018-01-01 RX ORDER — QUETIAPINE FUMARATE 400 MG/1
400 TABLET, FILM COATED ORAL
Qty: 30 TABLET | Refills: 1 | Status: SHIPPED | OUTPATIENT
Start: 2018-01-01 | End: 2018-01-01

## 2018-01-01 RX ORDER — NAPROXEN 250 MG/1
250 TABLET ORAL 2 TIMES DAILY WITH MEALS
Status: DISCONTINUED | OUTPATIENT
Start: 2018-01-01 | End: 2018-01-01

## 2018-01-01 RX ORDER — GABAPENTIN 100 MG/1
100 CAPSULE ORAL 3 TIMES DAILY
Status: DISCONTINUED | OUTPATIENT
Start: 2018-01-01 | End: 2018-01-01

## 2018-01-01 RX ORDER — CYCLOBENZAPRINE HCL 10 MG
10 TABLET ORAL 3 TIMES DAILY PRN
Qty: 30 TABLET | Refills: 0 | Status: SHIPPED | OUTPATIENT
Start: 2018-01-01 | End: 2018-01-01

## 2018-01-01 RX ORDER — LABETALOL HYDROCHLORIDE 5 MG/ML
10 INJECTION, SOLUTION INTRAVENOUS ONCE
Status: COMPLETED | OUTPATIENT
Start: 2018-01-01 | End: 2018-01-01

## 2018-01-01 RX ORDER — LORAZEPAM 1 MG/1
1 TABLET ORAL 3 TIMES DAILY
Status: DISCONTINUED | OUTPATIENT
Start: 2018-01-01 | End: 2018-01-01

## 2018-01-01 RX ORDER — QUETIAPINE FUMARATE 200 MG/1
200 TABLET, FILM COATED ORAL
Status: DISCONTINUED | OUTPATIENT
Start: 2018-01-01 | End: 2018-01-01

## 2018-01-01 RX ORDER — AMLODIPINE BESYLATE 5 MG/1
5 TABLET ORAL DAILY
Status: DISCONTINUED | OUTPATIENT
Start: 2018-01-01 | End: 2018-01-01 | Stop reason: HOSPADM

## 2018-01-01 RX ORDER — GABAPENTIN 100 MG/1
200 CAPSULE ORAL 3 TIMES DAILY
Status: DISCONTINUED | OUTPATIENT
Start: 2018-01-01 | End: 2018-01-01

## 2018-01-01 RX ORDER — MAGNESIUM HYDROXIDE/ALUMINUM HYDROXICE/SIMETHICONE 120; 1200; 1200 MG/30ML; MG/30ML; MG/30ML
30 SUSPENSION ORAL EVERY 4 HOURS PRN
Status: DISCONTINUED | OUTPATIENT
Start: 2018-01-01 | End: 2018-01-01 | Stop reason: HOSPADM

## 2018-01-01 RX ORDER — LANOLIN ALCOHOL/MO/W.PET/CERES
6 CREAM (GRAM) TOPICAL
Status: CANCELLED | OUTPATIENT
Start: 2018-01-01

## 2018-01-01 RX ORDER — SCOLOPAMINE TRANSDERMAL SYSTEM 1 MG/1
1 PATCH, EXTENDED RELEASE TRANSDERMAL
Status: DISCONTINUED | OUTPATIENT
Start: 2018-01-01 | End: 2018-11-27 | Stop reason: HOSPADM

## 2018-01-01 RX ORDER — GABAPENTIN 300 MG/1
300 CAPSULE ORAL 3 TIMES DAILY
Status: DISCONTINUED | OUTPATIENT
Start: 2018-01-01 | End: 2018-01-01

## 2018-01-01 RX ORDER — HYDROXYZINE HYDROCHLORIDE 25 MG/1
25 TABLET, FILM COATED ORAL 2 TIMES DAILY PRN
Qty: 60 TABLET | Refills: 1 | Status: SHIPPED | OUTPATIENT
Start: 2018-01-01 | End: 2018-01-01

## 2018-01-01 RX ADMIN — GABAPENTIN 300 MG: 300 CAPSULE ORAL at 16:24

## 2018-01-01 RX ADMIN — FLUTICASONE PROPIONATE 1 SPRAY: 50 SPRAY, METERED NASAL at 08:29

## 2018-01-01 RX ADMIN — NICOTINE 21 MG: 21 PATCH, EXTENDED RELEASE TRANSDERMAL at 08:17

## 2018-01-01 RX ADMIN — GABAPENTIN 100 MG: 100 CAPSULE ORAL at 17:17

## 2018-01-01 RX ADMIN — IBUPROFEN 600 MG: 600 TABLET ORAL at 20:41

## 2018-01-01 RX ADMIN — METHADONE HYDROCHLORIDE 80 MG: 10 TABLET ORAL at 13:42

## 2018-01-01 RX ADMIN — LAMOTRIGINE 25 MG: 25 TABLET ORAL at 09:44

## 2018-01-01 RX ADMIN — SODIUM CHLORIDE 10 MG/HR: 0.9 INJECTION, SOLUTION INTRAVENOUS at 05:35

## 2018-01-01 RX ADMIN — GABAPENTIN 300 MG: 300 CAPSULE ORAL at 08:35

## 2018-01-01 RX ADMIN — ACETAMINOPHEN 650 MG: 325 TABLET, FILM COATED ORAL at 20:28

## 2018-01-01 RX ADMIN — CLONIDINE HYDROCHLORIDE 0.1 MG: 0.1 TABLET ORAL at 22:32

## 2018-01-01 RX ADMIN — GABAPENTIN 200 MG: 100 CAPSULE ORAL at 21:26

## 2018-01-01 RX ADMIN — QUETIAPINE FUMARATE 400 MG: 200 TABLET ORAL at 21:20

## 2018-01-01 RX ADMIN — LORAZEPAM 0.5 MG: 0.5 TABLET ORAL at 17:03

## 2018-01-01 RX ADMIN — LORAZEPAM 0.5 MG: 0.5 TABLET ORAL at 21:26

## 2018-01-01 RX ADMIN — GABAPENTIN 200 MG: 100 CAPSULE ORAL at 16:59

## 2018-01-01 RX ADMIN — SODIUM CHLORIDE 150 ML/HR: 0.9 INJECTION, SOLUTION INTRAVENOUS at 17:15

## 2018-01-01 RX ADMIN — AMLODIPINE BESYLATE 5 MG: 5 TABLET ORAL at 08:15

## 2018-01-01 RX ADMIN — GABAPENTIN 300 MG: 300 CAPSULE ORAL at 21:20

## 2018-01-01 RX ADMIN — GABAPENTIN 200 MG: 100 CAPSULE ORAL at 08:20

## 2018-01-01 RX ADMIN — TRAZODONE HYDROCHLORIDE 100 MG: 100 TABLET, FILM COATED ORAL at 22:32

## 2018-01-01 RX ADMIN — CLONIDINE HYDROCHLORIDE 0.1 MG: 0.1 TABLET ORAL at 08:20

## 2018-01-01 RX ADMIN — HEPARIN SODIUM 5000 UNITS: 5000 INJECTION INTRAVENOUS; SUBCUTANEOUS at 05:25

## 2018-01-01 RX ADMIN — AMLODIPINE BESYLATE 5 MG: 5 TABLET ORAL at 08:20

## 2018-01-01 RX ADMIN — LAMOTRIGINE 25 MG: 25 TABLET ORAL at 12:39

## 2018-01-01 RX ADMIN — NICOTINE 21 MG: 21 PATCH, EXTENDED RELEASE TRANSDERMAL at 08:21

## 2018-01-01 RX ADMIN — NICOTINE 21 MG: 21 PATCH, EXTENDED RELEASE TRANSDERMAL at 12:40

## 2018-01-01 RX ADMIN — GABAPENTIN 200 MG: 100 CAPSULE ORAL at 16:35

## 2018-01-01 RX ADMIN — CYCLOBENZAPRINE HYDROCHLORIDE 10 MG: 10 TABLET, FILM COATED ORAL at 08:28

## 2018-01-01 RX ADMIN — CYCLOBENZAPRINE HYDROCHLORIDE 10 MG: 10 TABLET, FILM COATED ORAL at 08:37

## 2018-01-01 RX ADMIN — METHADONE HYDROCHLORIDE 40 MG: 10 TABLET ORAL at 08:16

## 2018-01-01 RX ADMIN — HYDROXYZINE HYDROCHLORIDE 25 MG: 25 TABLET, FILM COATED ORAL at 21:28

## 2018-01-01 RX ADMIN — IBUPROFEN 600 MG: 600 TABLET ORAL at 14:32

## 2018-01-01 RX ADMIN — CHLORHEXIDINE GLUCONATE 0.12% ORAL RINSE 15 ML: 1.2 LIQUID ORAL at 16:57

## 2018-01-01 RX ADMIN — GABAPENTIN 100 MG: 100 CAPSULE ORAL at 09:43

## 2018-01-01 RX ADMIN — SODIUM CHLORIDE 3.5 MG/HR: 0.9 INJECTION, SOLUTION INTRAVENOUS at 23:24

## 2018-01-01 RX ADMIN — NICOTINE 21 MG: 21 PATCH, EXTENDED RELEASE TRANSDERMAL at 09:45

## 2018-01-01 RX ADMIN — GABAPENTIN 100 MG: 100 CAPSULE ORAL at 21:16

## 2018-01-01 RX ADMIN — SODIUM CHLORIDE 150 ML/HR: 0.9 INJECTION, SOLUTION INTRAVENOUS at 15:30

## 2018-01-01 RX ADMIN — HYDROXYZINE HYDROCHLORIDE 25 MG: 25 TABLET, FILM COATED ORAL at 12:19

## 2018-01-01 RX ADMIN — AMLODIPINE BESYLATE 5 MG: 5 TABLET ORAL at 08:29

## 2018-01-01 RX ADMIN — QUETIAPINE FUMARATE 100 MG: 100 TABLET ORAL at 21:19

## 2018-01-01 RX ADMIN — LINACLOTIDE 1 CAPSULE: 290 CAPSULE, GELATIN COATED ORAL at 08:27

## 2018-01-01 RX ADMIN — CLONIDINE HYDROCHLORIDE 0.1 MG: 0.1 TABLET ORAL at 17:03

## 2018-01-01 RX ADMIN — QUETIAPINE FUMARATE 400 MG: 200 TABLET ORAL at 21:28

## 2018-01-01 RX ADMIN — TRAZODONE HYDROCHLORIDE 150 MG: 100 TABLET, FILM COATED ORAL at 21:28

## 2018-01-01 RX ADMIN — HYDROXYZINE HYDROCHLORIDE 25 MG: 25 TABLET, FILM COATED ORAL at 08:30

## 2018-01-01 RX ADMIN — SODIUM CHLORIDE 150 ML/HR: 0.9 INJECTION, SOLUTION INTRAVENOUS at 07:19

## 2018-01-01 RX ADMIN — IBUPROFEN 600 MG: 600 TABLET ORAL at 16:59

## 2018-01-01 RX ADMIN — LABETALOL 20 MG/4 ML (5 MG/ML) INTRAVENOUS SYRINGE 10 MG: at 12:51

## 2018-01-01 RX ADMIN — TRAZODONE HYDROCHLORIDE 100 MG: 100 TABLET, FILM COATED ORAL at 21:18

## 2018-01-01 RX ADMIN — IBUPROFEN 600 MG: 600 TABLET ORAL at 15:53

## 2018-01-01 RX ADMIN — AMLODIPINE BESYLATE 5 MG: 5 TABLET ORAL at 08:35

## 2018-01-01 RX ADMIN — LAMOTRIGINE 25 MG: 25 TABLET ORAL at 08:20

## 2018-01-01 RX ADMIN — GABAPENTIN 200 MG: 100 CAPSULE ORAL at 08:15

## 2018-01-01 RX ADMIN — CLONIDINE HYDROCHLORIDE 0.1 MG: 0.1 TABLET ORAL at 21:29

## 2018-01-01 RX ADMIN — TRAZODONE HYDROCHLORIDE 100 MG: 100 TABLET, FILM COATED ORAL at 21:28

## 2018-01-01 RX ADMIN — METHADONE HYDROCHLORIDE 5 MG: 10 TABLET ORAL at 01:16

## 2018-01-01 RX ADMIN — IBUPROFEN 600 MG: 600 TABLET ORAL at 16:15

## 2018-01-01 RX ADMIN — IBUPROFEN 600 MG: 600 TABLET ORAL at 11:48

## 2018-01-01 RX ADMIN — LORAZEPAM 0.5 MG: 0.5 TABLET ORAL at 17:16

## 2018-01-01 RX ADMIN — GABAPENTIN 300 MG: 300 CAPSULE ORAL at 16:11

## 2018-01-01 RX ADMIN — INSULIN LISPRO 1 UNITS: 100 INJECTION, SOLUTION INTRAVENOUS; SUBCUTANEOUS at 05:34

## 2018-01-01 RX ADMIN — NICOTINE 21 MG: 21 PATCH, EXTENDED RELEASE TRANSDERMAL at 08:16

## 2018-01-01 RX ADMIN — LAMOTRIGINE 50 MG: 25 TABLET ORAL at 08:34

## 2018-01-01 RX ADMIN — TRAZODONE HYDROCHLORIDE 100 MG: 100 TABLET, FILM COATED ORAL at 21:15

## 2018-01-01 RX ADMIN — CLONIDINE HYDROCHLORIDE 0.1 MG: 0.1 TABLET ORAL at 16:36

## 2018-01-01 RX ADMIN — LORAZEPAM 0.5 MG: 0.5 TABLET ORAL at 18:03

## 2018-01-01 RX ADMIN — METHADONE HYDROCHLORIDE 10 MG: 10 TABLET ORAL at 08:30

## 2018-01-01 RX ADMIN — SODIUM CHLORIDE 5 MG/HR: 0.9 INJECTION, SOLUTION INTRAVENOUS at 16:07

## 2018-01-01 RX ADMIN — GABAPENTIN 100 MG: 100 CAPSULE ORAL at 22:32

## 2018-01-01 RX ADMIN — CLONIDINE HYDROCHLORIDE 0.1 MG: 0.1 TABLET ORAL at 08:30

## 2018-01-01 RX ADMIN — HEPARIN SODIUM 5000 UNITS: 5000 INJECTION INTRAVENOUS; SUBCUTANEOUS at 13:38

## 2018-01-01 RX ADMIN — GABAPENTIN 300 MG: 300 CAPSULE ORAL at 08:27

## 2018-01-01 RX ADMIN — EPINEPHRINE 1 MG: 0.1 INJECTION, SOLUTION ENDOTRACHEAL; INTRACARDIAC; INTRAVENOUS at 09:11

## 2018-01-01 RX ADMIN — METHADONE HYDROCHLORIDE 30 MG: 10 TABLET ORAL at 08:20

## 2018-01-01 RX ADMIN — LINACLOTIDE 1 CAPSULE: 290 CAPSULE, GELATIN COATED ORAL at 09:53

## 2018-01-01 RX ADMIN — GABAPENTIN 300 MG: 300 CAPSULE ORAL at 08:28

## 2018-01-01 RX ADMIN — METHADONE HYDROCHLORIDE 20 MG: 10 TABLET ORAL at 08:30

## 2018-01-01 RX ADMIN — LORAZEPAM 0.5 MG: 0.5 TABLET ORAL at 08:20

## 2018-01-01 RX ADMIN — SODIUM CHLORIDE 12.5 MG/HR: 0.9 INJECTION, SOLUTION INTRAVENOUS at 07:19

## 2018-01-01 RX ADMIN — QUETIAPINE FUMARATE 300 MG: 300 TABLET ORAL at 21:18

## 2018-01-01 RX ADMIN — INSULIN LISPRO 2 UNITS: 100 INJECTION, SOLUTION INTRAVENOUS; SUBCUTANEOUS at 16:57

## 2018-01-01 RX ADMIN — GABAPENTIN 200 MG: 100 CAPSULE ORAL at 21:28

## 2018-01-01 RX ADMIN — IBUPROFEN 600 MG: 600 TABLET ORAL at 08:37

## 2018-01-01 RX ADMIN — GABAPENTIN 300 MG: 300 CAPSULE ORAL at 08:29

## 2018-01-01 RX ADMIN — HALOPERIDOL 5 MG: 5 TABLET ORAL at 02:51

## 2018-01-01 RX ADMIN — CLONIDINE HYDROCHLORIDE 0.1 MG: 0.1 TABLET ORAL at 08:27

## 2018-01-01 RX ADMIN — CYCLOBENZAPRINE HYDROCHLORIDE 10 MG: 10 TABLET, FILM COATED ORAL at 17:22

## 2018-01-01 RX ADMIN — ACETAMINOPHEN 650 MG: 160 SUSPENSION ORAL at 02:45

## 2018-01-01 RX ADMIN — HEPARIN SODIUM 5000 UNITS: 5000 INJECTION INTRAVENOUS; SUBCUTANEOUS at 16:57

## 2018-01-01 RX ADMIN — QUETIAPINE FUMARATE 200 MG: 200 TABLET ORAL at 22:31

## 2018-01-01 RX ADMIN — SODIUM CHLORIDE 15 MG/HR: 0.9 INJECTION, SOLUTION INTRAVENOUS at 09:26

## 2018-01-01 RX ADMIN — FLUTICASONE PROPIONATE 1 SPRAY: 50 SPRAY, METERED NASAL at 08:25

## 2018-01-01 RX ADMIN — NALOXONE HYDROCHLORIDE 2 MG: 1 INJECTION PARENTERAL at 09:10

## 2018-01-01 RX ADMIN — QUETIAPINE FUMARATE 350 MG: 300 TABLET ORAL at 21:26

## 2018-01-01 RX ADMIN — LAMOTRIGINE 50 MG: 25 TABLET ORAL at 08:32

## 2018-01-01 RX ADMIN — GABAPENTIN 100 MG: 100 CAPSULE ORAL at 09:55

## 2018-01-01 RX ADMIN — NICOTINE 21 MG: 21 PATCH, EXTENDED RELEASE TRANSDERMAL at 08:31

## 2018-01-01 RX ADMIN — GABAPENTIN 300 MG: 300 CAPSULE ORAL at 21:28

## 2018-01-01 RX ADMIN — AMLODIPINE BESYLATE 5 MG: 5 TABLET ORAL at 08:17

## 2018-01-01 RX ADMIN — IBUPROFEN 600 MG: 600 TABLET ORAL at 08:45

## 2018-01-01 RX ADMIN — FLUTICASONE PROPIONATE 1 SPRAY: 50 SPRAY, METERED NASAL at 08:42

## 2018-01-01 RX ADMIN — LORAZEPAM 0.5 MG: 0.5 TABLET ORAL at 17:00

## 2018-01-01 RX ADMIN — LAMOTRIGINE 50 MG: 25 TABLET ORAL at 08:17

## 2018-01-01 RX ADMIN — TRAZODONE HYDROCHLORIDE 100 MG: 100 TABLET, FILM COATED ORAL at 21:26

## 2018-01-01 RX ADMIN — METHADONE HYDROCHLORIDE 70 MG: 10 TABLET ORAL at 09:53

## 2018-01-01 RX ADMIN — METHADONE HYDROCHLORIDE 60 MG: 10 TABLET ORAL at 10:21

## 2018-01-01 RX ADMIN — CARBAMAZEPINE 100 MG: 100 TABLET, EXTENDED RELEASE ORAL at 08:28

## 2018-01-01 RX ADMIN — LORAZEPAM 0.5 MG: 0.5 TABLET ORAL at 12:39

## 2018-01-01 RX ADMIN — TRAZODONE HYDROCHLORIDE 150 MG: 100 TABLET, FILM COATED ORAL at 21:55

## 2018-01-01 RX ADMIN — CLONIDINE HYDROCHLORIDE 0.1 MG: 0.1 TABLET ORAL at 21:16

## 2018-01-01 RX ADMIN — GABAPENTIN 300 MG: 300 CAPSULE ORAL at 08:17

## 2018-01-01 RX ADMIN — LINACLOTIDE 1 CAPSULE: 290 CAPSULE, GELATIN COATED ORAL at 08:28

## 2018-01-01 RX ADMIN — AMLODIPINE BESYLATE 5 MG: 5 TABLET ORAL at 08:27

## 2018-01-01 RX ADMIN — LORAZEPAM 0.5 MG: 0.5 TABLET ORAL at 16:35

## 2018-01-01 RX ADMIN — SODIUM CHLORIDE 150 ML/HR: 0.9 INJECTION, SOLUTION INTRAVENOUS at 00:45

## 2018-01-01 RX ADMIN — GABAPENTIN 300 MG: 300 CAPSULE ORAL at 16:31

## 2018-01-01 RX ADMIN — CLONIDINE HYDROCHLORIDE 0.1 MG: 0.1 TABLET ORAL at 16:10

## 2018-01-01 RX ADMIN — QUETIAPINE FUMARATE 400 MG: 200 TABLET ORAL at 21:43

## 2018-01-01 RX ADMIN — AMLODIPINE BESYLATE 5 MG: 5 TABLET ORAL at 09:55

## 2018-01-01 RX ADMIN — POTASSIUM CHLORIDE 40 MEQ: 400 INJECTION, SOLUTION INTRAVENOUS at 05:25

## 2018-01-01 RX ADMIN — LORAZEPAM 0.5 MG: 0.5 TABLET ORAL at 08:28

## 2018-01-01 RX ADMIN — HEPARIN SODIUM 5000 UNITS: 5000 INJECTION INTRAVENOUS; SUBCUTANEOUS at 22:34

## 2018-01-01 RX ADMIN — LORAZEPAM 0.5 MG: 0.5 TABLET ORAL at 09:43

## 2018-01-01 RX ADMIN — SODIUM CHLORIDE 5 MG/HR: 0.9 INJECTION, SOLUTION INTRAVENOUS at 20:01

## 2018-01-01 RX ADMIN — CLONIDINE HYDROCHLORIDE 0.1 MG: 0.1 TABLET ORAL at 16:08

## 2018-01-01 RX ADMIN — LORAZEPAM 0.5 MG: 0.5 TABLET ORAL at 21:17

## 2018-01-01 RX ADMIN — HYDROXYZINE HYDROCHLORIDE 25 MG: 25 TABLET, FILM COATED ORAL at 15:55

## 2018-01-01 RX ADMIN — CYCLOBENZAPRINE HYDROCHLORIDE 10 MG: 10 TABLET, FILM COATED ORAL at 14:32

## 2018-01-01 RX ADMIN — NICOTINE 21 MG: 21 PATCH, EXTENDED RELEASE TRANSDERMAL at 08:20

## 2018-01-01 RX ADMIN — NICOTINE 21 MG: 21 PATCH, EXTENDED RELEASE TRANSDERMAL at 10:06

## 2018-01-01 RX ADMIN — INSULIN LISPRO 2 UNITS: 100 INJECTION, SOLUTION INTRAVENOUS; SUBCUTANEOUS at 13:38

## 2018-01-01 RX ADMIN — TRAZODONE HYDROCHLORIDE 100 MG: 100 TABLET, FILM COATED ORAL at 21:20

## 2018-01-01 RX ADMIN — GABAPENTIN 300 MG: 300 CAPSULE ORAL at 15:55

## 2018-01-01 RX ADMIN — METRONIDAZOLE 500 MG: 500 INJECTION, SOLUTION INTRAVENOUS at 09:36

## 2018-01-01 RX ADMIN — HYDROXYZINE HYDROCHLORIDE 25 MG: 25 TABLET, FILM COATED ORAL at 02:50

## 2018-01-01 RX ADMIN — SODIUM BICARBONATE 50 MEQ: 84 INJECTION, SOLUTION INTRAVENOUS at 09:21

## 2018-01-01 RX ADMIN — CYCLOBENZAPRINE HYDROCHLORIDE 10 MG: 10 TABLET, FILM COATED ORAL at 11:48

## 2018-01-01 RX ADMIN — SODIUM CHLORIDE 7.5 MG/HR: 0.9 INJECTION, SOLUTION INTRAVENOUS at 14:06

## 2018-01-01 RX ADMIN — SODIUM CHLORIDE 150 ML/HR: 0.9 INJECTION, SOLUTION INTRAVENOUS at 19:09

## 2018-01-01 RX ADMIN — LORAZEPAM 0.5 MG: 0.5 TABLET ORAL at 16:09

## 2018-01-01 RX ADMIN — LAMOTRIGINE 25 MG: 25 TABLET ORAL at 09:55

## 2018-01-01 RX ADMIN — AMLODIPINE BESYLATE 5 MG: 5 TABLET ORAL at 12:40

## 2018-01-01 RX ADMIN — QUETIAPINE FUMARATE 400 MG: 200 TABLET ORAL at 21:29

## 2018-01-01 RX ADMIN — CYCLOBENZAPRINE HYDROCHLORIDE 10 MG: 10 TABLET, FILM COATED ORAL at 20:41

## 2018-01-01 RX ADMIN — CEFEPIME HYDROCHLORIDE 1000 MG: 1 INJECTION, POWDER, FOR SOLUTION INTRAMUSCULAR; INTRAVENOUS at 20:02

## 2018-01-01 RX ADMIN — EPINEPHRINE 1 MG: 0.1 INJECTION, SOLUTION ENDOTRACHEAL; INTRACARDIAC; INTRAVENOUS at 09:08

## 2018-01-01 RX ADMIN — MELATONIN TAB 3 MG 6 MG: 3 TAB at 02:50

## 2018-01-01 RX ADMIN — DOCUSATE SODIUM 100 MG: 100 CAPSULE, LIQUID FILLED ORAL at 11:00

## 2018-01-01 RX ADMIN — LAMOTRIGINE 50 MG: 25 TABLET ORAL at 08:27

## 2018-01-01 RX ADMIN — CEFEPIME HYDROCHLORIDE 1000 MG: 1 INJECTION, POWDER, FOR SOLUTION INTRAMUSCULAR; INTRAVENOUS at 09:36

## 2018-01-01 RX ADMIN — GABAPENTIN 100 MG: 100 CAPSULE ORAL at 17:03

## 2018-01-01 RX ADMIN — GABAPENTIN 100 MG: 100 CAPSULE ORAL at 21:17

## 2018-01-01 RX ADMIN — Medication 2 MG/HR: at 21:13

## 2018-01-01 RX ADMIN — CYCLOBENZAPRINE HYDROCHLORIDE 10 MG: 10 TABLET, FILM COATED ORAL at 08:45

## 2018-01-01 RX ADMIN — SCOPALAMINE 1 PATCH: 1 PATCH, EXTENDED RELEASE TRANSDERMAL at 21:17

## 2018-01-01 RX ADMIN — METHADONE HYDROCHLORIDE 50 MG: 10 TABLET ORAL at 08:21

## 2018-01-01 RX ADMIN — CHLORHEXIDINE GLUCONATE 0.12% ORAL RINSE 15 ML: 1.2 LIQUID ORAL at 20:29

## 2018-01-01 RX ADMIN — LAMOTRIGINE 25 MG: 25 TABLET ORAL at 08:15

## 2018-01-01 RX ADMIN — LAMOTRIGINE 50 MG: 25 TABLET ORAL at 08:29

## 2018-01-01 RX ADMIN — LORAZEPAM 0.5 MG: 0.5 TABLET ORAL at 21:16

## 2018-01-01 RX ADMIN — CHLORHEXIDINE GLUCONATE 0.12% ORAL RINSE 15 ML: 1.2 LIQUID ORAL at 09:36

## 2018-01-01 RX ADMIN — GABAPENTIN 300 MG: 300 CAPSULE ORAL at 21:55

## 2018-01-01 RX ADMIN — NOREPINEPHRINE BITARTRATE 4 MCG/MIN: 1 INJECTION INTRAVENOUS at 09:30

## 2018-01-01 RX ADMIN — NICOTINE 21 MG: 21 PATCH, EXTENDED RELEASE TRANSDERMAL at 09:57

## 2018-01-01 RX ADMIN — LAMOTRIGINE 25 MG: 25 TABLET ORAL at 08:21

## 2018-01-01 RX ADMIN — QUETIAPINE FUMARATE 400 MG: 200 TABLET ORAL at 21:55

## 2018-01-01 RX ADMIN — CYCLOBENZAPRINE HYDROCHLORIDE 10 MG: 10 TABLET, FILM COATED ORAL at 16:15

## 2018-01-01 RX ADMIN — NICOTINE 21 MG: 21 PATCH, EXTENDED RELEASE TRANSDERMAL at 08:28

## 2018-01-01 RX ADMIN — LORAZEPAM 0.5 MG: 0.5 TABLET ORAL at 09:55

## 2018-01-01 RX ADMIN — LORAZEPAM 0.5 MG: 0.5 TABLET ORAL at 08:29

## 2018-01-01 RX ADMIN — GABAPENTIN 300 MG: 300 CAPSULE ORAL at 16:13

## 2018-01-01 RX ADMIN — LINACLOTIDE 1 CAPSULE: 290 CAPSULE, GELATIN COATED ORAL at 08:17

## 2018-01-01 RX ADMIN — LORAZEPAM 0.5 MG: 0.5 TABLET ORAL at 08:21

## 2018-01-01 RX ADMIN — CLONIDINE HYDROCHLORIDE 0.1 MG: 0.1 TABLET ORAL at 16:24

## 2018-01-01 RX ADMIN — CYCLOBENZAPRINE HYDROCHLORIDE 10 MG: 10 TABLET, FILM COATED ORAL at 02:51

## 2018-01-01 RX ADMIN — LINACLOTIDE 1 CAPSULE: 290 CAPSULE, GELATIN COATED ORAL at 08:35

## 2018-01-01 RX ADMIN — CYCLOBENZAPRINE HYDROCHLORIDE 10 MG: 10 TABLET, FILM COATED ORAL at 18:10

## 2018-01-01 RX ADMIN — SODIUM CHLORIDE 10 MG/HR: 0.9 INJECTION, SOLUTION INTRAVENOUS at 03:18

## 2018-01-01 RX ADMIN — GABAPENTIN 300 MG: 300 CAPSULE ORAL at 21:29

## 2018-01-01 RX ADMIN — CYCLOBENZAPRINE HYDROCHLORIDE 10 MG: 10 TABLET, FILM COATED ORAL at 12:54

## 2018-01-01 RX ADMIN — INSULIN LISPRO 2 UNITS: 100 INJECTION, SOLUTION INTRAVENOUS; SUBCUTANEOUS at 18:35

## 2018-01-01 RX ADMIN — LORAZEPAM 0.5 MG: 0.5 TABLET ORAL at 08:15

## 2018-01-01 RX ADMIN — TRAZODONE HYDROCHLORIDE 150 MG: 100 TABLET, FILM COATED ORAL at 21:43

## 2018-01-01 RX ADMIN — GABAPENTIN 300 MG: 300 CAPSULE ORAL at 20:41

## 2018-01-01 RX ADMIN — LORAZEPAM 0.5 MG: 0.5 TABLET ORAL at 22:32

## 2018-01-01 RX ADMIN — FLUTICASONE PROPIONATE 1 SPRAY: 50 SPRAY, METERED NASAL at 09:48

## 2018-01-01 RX ADMIN — MORPHINE SULFATE 2 MG: 2 INJECTION, SOLUTION INTRAMUSCULAR; INTRAVENOUS at 21:12

## 2018-01-01 RX ADMIN — NALOXONE HYDROCHLORIDE 2 MG: 1 INJECTION PARENTERAL at 09:16

## 2018-01-01 RX ADMIN — METRONIDAZOLE 500 MG: 500 INJECTION, SOLUTION INTRAVENOUS at 16:15

## 2018-01-01 RX ADMIN — POTASSIUM CHLORIDE 40 MEQ: 20 SOLUTION ORAL at 05:25

## 2018-01-01 RX ADMIN — GABAPENTIN 100 MG: 100 CAPSULE ORAL at 08:20

## 2018-01-01 RX ADMIN — IBUPROFEN 600 MG: 600 TABLET ORAL at 10:24

## 2018-01-01 RX ADMIN — CLONIDINE HYDROCHLORIDE 0.1 MG: 0.1 TABLET ORAL at 08:16

## 2018-01-01 RX ADMIN — FLUTICASONE PROPIONATE 1 SPRAY: 50 SPRAY, METERED NASAL at 08:27

## 2018-01-01 RX ADMIN — CLONIDINE HYDROCHLORIDE 0.1 MG: 0.1 TABLET ORAL at 21:27

## 2018-01-01 RX ADMIN — GABAPENTIN 100 MG: 100 CAPSULE ORAL at 16:08

## 2018-01-01 RX ADMIN — DOCUSATE SODIUM 100 MG: 100 CAPSULE, LIQUID FILLED ORAL at 14:32

## 2018-01-01 RX ADMIN — NICOTINE 21 MG: 21 PATCH, EXTENDED RELEASE TRANSDERMAL at 08:30

## 2018-01-01 RX ADMIN — SODIUM BICARBONATE 50 MEQ: 84 INJECTION, SOLUTION INTRAVENOUS at 09:13

## 2018-01-01 RX ADMIN — CLONIDINE HYDROCHLORIDE 0.1 MG: 0.1 TABLET ORAL at 08:34

## 2018-05-04 PROBLEM — F11.20 OPIOID TYPE DEPENDENCE, CONTINUOUS (HCC): Chronic | Status: ACTIVE | Noted: 2018-01-01

## 2018-05-04 PROBLEM — F43.12 POST-TRAUMATIC STRESS DISORDER, CHRONIC: Chronic | Status: ACTIVE | Noted: 2018-01-01

## 2018-05-04 PROBLEM — F10.21 ALCOHOL DEPENDENCE, IN REMISSION (HCC): Chronic | Status: ACTIVE | Noted: 2018-01-01

## 2018-05-04 NOTE — ED ATTENDING ATTESTATION
Jud Huddleston MD, saw and evaluated the patient  I have discussed the patient with the resident/non-physician practitioner and agree with the resident's/non-physician practitioner's findings, Plan of Care, and MDM as documented in the resident's/non-physician practitioner's note, except where noted  All available labs and Radiology studies were reviewed  At this point I agree with the current assessment done in the Emergency Department  I have conducted an independent evaluation of this patient a history and physical is as follows:      Pt with increasing depression and anxiety she is not taking her medications but feels they are not working anyway Pt with si with Rob Vargas on how to do it PE; alert nad heart reg lungs clear abd soft nontender neuro nonfocal MDM: will have crisis eval  Critical Care Time  CritCare Time    Procedures

## 2018-05-04 NOTE — ED NOTES
Patient is accepted at [facility]  Patient is accepted by Dr Yessy Silver per Kaley Junior  Patient may go to the floor at [address] [facility] report is to be called to [phone] prior to patient transfer

## 2018-05-04 NOTE — PLAN OF CARE
Problem: Depression  Goal: Treatment Goal: Demonstrate behavioral control of depressive symptoms, verbalize feelings of improved mood/affect, and adopt new coping skills prior to discharge  Outcome: Progressing    Goal: Verbalize thoughts and feelings  Interventions:  - Assess and re-assess patient's level of risk   - Engage patient in 1:1 interactions, daily, for a minimum of 15 minutes   - Encourage patient to express feelings, fears, frustrations, hopes    Outcome: Progressing    Goal: Refrain from harming self  Interventions:  - Monitor patient closely, per order   - Supervise medication ingestion, monitor effects and side effects    Outcome: Progressing    Goal: Refrain from isolation  Interventions:  - Develop a trusting relationship   - Encourage socialization    Outcome: Progressing    Goal: Refrain from self-neglect  Outcome: Progressing    Goal: Complete daily ADLs, including personal hygiene independently, as able  Interventions:  - Observe, teach, and assist patient with ADLS  -  Monitor and promote a balance of rest/activity, with adequate nutrition and elimination    Outcome: Progressing      Problem: DISCHARGE PLANNING  Goal: Discharge to home or other facility with appropriate resources  INTERVENTIONS:  - Identify barriers to discharge w/patient and caregiver  - Arrange for needed discharge resources and transportation  - Refer to Case Management Department for coordinating discharge planning if the patient needs post-hospital services based on physician/advanced practitioner order or complex needs related to functional status, cognitive ability, or social support system  propriate  - Identify discharge learning needs (meds, wound care, etc )  -   Outcome: Progressing

## 2018-05-04 NOTE — CONSULTS
Consult Note   Assessment:  Bipolar disorder  Anxiety  Opioid abuse-follows up in methadone clinic   Hepatitis-C  History Hypertension  PTSD  Medication noncompliance    Plan:  Admitted in the hospital for psych evaluation treatment  Continue all medications  Labs are pending  Advice to follow up with GI physician for future hepatitis-C treatment   ______________________________________________________________________    Consulting Service: Psychiatry    Chief Complaint:  Increased anxiety, depression, suicide ideation    HPI: Cassie Vasquez,  a 50 y o  female is admitted in the hospital for worsening anxiety, depression and suicide ideation, wanted to kill herself via opioids or illicit drug use  Patient states she lost her psychiatric medication clinic last fall and has not taken any medication since  Denies any homicide ideation or hallucinations  Please refer to psych evaluation for more information  History of hepatitis-C, follows up with Dr Ana Luisa Cardenas, a GI physician  Patient states she is supposed to start her hep C medication but have not started yet  Denies any medical issues at present including chest pain, difficulty breathing, shortness of breath, fever, chills or any abdominal issues      Review of Systems:  General: negative  Cardiovascular: no chest pain or dyspnea on exertion  Respiratory: no cough, shortness of breath, or wheezing  Gastrointestinal: no abdominal pain, change in bowel habits, or black or bloody stools  Genitourinary ROS: no dysuria, trouble voiding, or hematuria  Musculoskeletal ROS: negative  Neurological ROS: no TIA or stroke symptoms  Hematological and Lymphatic ROS: negative  Dermatological ROS: negative  Psychological ROS: positive for - anxiety, depression and suicidal ideation, insomnia, decreased appetite  Ophthalmic ROS: negative  ENT ROS: negative    Past Medical History:  Past Medical History:   Diagnosis Date    Anxiety     Bipolar disorder (HCC)     Depression  Hypertension     Psychiatric disorder     PTSD (post-traumatic stress disorder)        Past Surgical History:  Past Surgical History:   Procedure Laterality Date    HAND SURGERY Right        Social History:  History   Alcohol Use No     History   Drug Use    Types: Oxycodone, Other     Comment: previous history  Goes weekly for therapy     History   Smoking Status    Current Every Day Smoker    Packs/day: 1 00    Years: 15 00   Smokeless Tobacco    Not on file       Family History:  History reviewed  No pertinent family history      Allergies:  No Known Allergies    Medications:  Current Facility-Administered Medications   Medication Dose Route Frequency    acetaminophen (TYLENOL) tablet 650 mg  650 mg Oral Q6H PRN    aluminum-magnesium hydroxide-simethicone (MYLANTA) 200-200-20 mg/5 mL oral suspension 30 mL  30 mL Oral Q4H PRN    benztropine (COGENTIN) injection 1 mg  1 mg Intramuscular Q6H PRN    benztropine (COGENTIN) tablet 1 mg  1 mg Oral Q6H PRN    carBAMazepine (TEGretol XR) 12 hr tablet 100 mg  100 mg Oral Daily    carBAMazepine (TEGretol XR) 12 hr tablet 200 mg  200 mg Oral After Dinner    cyclobenzaprine (FLEXERIL) tablet 10 mg  10 mg Oral TID PRN    gabapentin (NEURONTIN) capsule 300 mg  300 mg Oral TID    haloperidol (HALDOL) tablet 5 mg  5 mg Oral Q6H PRN    haloperidol lactate (HALDOL) injection 5 mg  5 mg Intramuscular Q6H PRN    hydrOXYzine HCL (ATARAX) tablet 25 mg  25 mg Oral Q4H PRN    magnesium hydroxide (MILK OF MAGNESIA) 400 mg/5 mL oral suspension 30 mL  30 mL Oral Daily PRN    melatonin tablet 6 mg  6 mg Oral HS PRN    naproxen (NAPROSYN) tablet 250 mg  250 mg Oral BID With Meals    nicotine polacrilex (NICORETTE) gum 4 mg  4 mg Oral Q2H PRN    traZODone (DESYREL) tablet 200 mg  200 mg Oral HS       Vitals:  /64 (05/04/18 0800)    Temp 97 9 °F (36 6 °C) (05/04/18 0800)    Pulse 64 (05/04/18 0800)   Resp 18 (05/04/18 0800)    SpO2        I/Os:  No intake/output data recorded  No intake/output data recorded  Lab Results and Cultures:   CBC with diff:   Lab Results   Component Value Date    WBC 4 93 03/07/2016    HGB 12 2 03/07/2016    HCT 36 5 03/07/2016    MCV 91 03/07/2016     (L) 03/07/2016    MCH 30 4 03/07/2016    MCHC 33 4 03/07/2016    RDW 13 1 03/07/2016    MPV 12 0 03/07/2016    NRBC 0 03/07/2016   ,   BMP/CMP:  Lab Results   Component Value Date     09/13/2016     10/30/2014    K 3 8 09/13/2016    K 4 3 10/30/2014     09/13/2016     10/30/2014    CO2 29 09/13/2016    CO2 25 10/30/2014    ANIONGAP 7 09/13/2016    ANIONGAP 9 10/30/2014    BUN 17 09/13/2016    BUN 12 10/30/2014    CREATININE 1 11 09/13/2016    CREATININE 0 98 10/30/2014    GLUCOSE 97 09/13/2016    GLUCOSE 103 10/30/2014    CALCIUM 8 7 09/13/2016    CALCIUM 9 4 10/30/2014    AST 48 (H) 09/11/2016    AST 32 10/30/2014    ALT 74 09/11/2016    ALT 76 10/30/2014    ALKPHOS 84 09/11/2016    ALKPHOS 89 10/30/2014    PROT 8 0 09/11/2016    PROT 7 6 10/30/2014    BILITOT 0 40 09/11/2016    BILITOT 0 4 10/30/2014    EGFR 52 9 09/13/2016   ,   Lipid Panel:   Lab Results   Component Value Date    CHOL 190 09/11/2016    CHOL 211 10/26/2014   ,   Coags: No results found for: PT, PTT, INR,     Blood Culture: No results found for: BLOODCX,   Urinalysis:   Lab Results   Component Value Date    COLORU Yellow 09/14/2016    CLARITYU Clear 09/14/2016    SPECGRAV 1 010 09/14/2016    PHUR 7 0 09/14/2016    LEUKOCYTESUR Negative 09/14/2016    NITRITE Negative 09/14/2016    PROTEINUA Negative 09/14/2016    GLUCOSEU Negative 09/14/2016    KETONESU Negative 09/14/2016    BILIRUBINUR Negative 09/14/2016    BLOODU Negative 09/14/2016     Physical Exam:    General appearance: alert and oriented, in no acute distress, appears depressed  Head: Normocephalic, without obvious abnormality, atraumatic  Eyes: conjunctivae/corneas clear  PERRL, EOM's intact  Fundi benign    Throat: lips, mucosa, and tongue normal; teeth and gums normal  Neck: no adenopathy, no carotid bruit, no JVD, supple, symmetrical, trachea midline and thyroid not enlarged, symmetric, no tenderness/mass/nodules  Back: symmetric, no curvature  ROM normal  No CVA tenderness  Lungs: clear to auscultation bilaterally  Chest wall: no tenderness  Heart: regular rate and rhythm, S1, S2 normal, no murmur, click, rub or gallop  Abdomen: soft, non-tender; bowel sounds normal; no masses,  no organomegaly  Genitalia: deferred  Rectal: deferred  Extremities: extremities normal, warm and well-perfused; no cyanosis, clubbing, or edema, needle marks on both hands, no sign of infection  Skin: Skin color, texture, turgor normal  No rashes or lesions  Neurologic: Alert and oriented X 3, normal strength and tone  Normal symmetric reflexes   Normal coordination and gait      Salas Pantoja PA-C  5/4/2018

## 2018-05-04 NOTE — PROGRESS NOTES
This RN confirmed methadone maintenance dose of 90 mg daily and patient last "dosed" yesterday, 5/3, per [facility]   made aware

## 2018-05-04 NOTE — PROGRESS NOTES
Pt declined lunch, stating "I don't eat like that on the outside"  Pt did have breakfast, has been drinking fluids

## 2018-05-04 NOTE — PROGRESS NOTES
Pt denies SI  Pt states she is physically not feeling well due to being in withdrawal from methadone and opiates  Pt appears depressed and has been resting in bed  Will monitor

## 2018-05-04 NOTE — ED NOTES
Pt belongings were inventoried and lock up at [address] [facility] on the floor     [facility]  05/03/18 2048
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
## 2018-05-04 NOTE — CASE MANAGEMENT
Met with pt  Signed tx plan & ROIs for [facility], PCP, & [facility]  Said she wanted to get off of Methadone, & the dr said she'd help her do that  Not involved in out pt tx  Wants to go to [place]  Lives alone  Said she was working [employer] @ [employer]  Did not have an emergency   C/O tiredness due to meds  Calm  Cooperative

## 2018-05-04 NOTE — PROGRESS NOTES
Patient in bed this afternoon but cooperative with medications  Reported muscle spasms causing 6/10 pain; patient declined Tylenol but accepted Flexeril prn  Patient said she "can't stop her mind" that she's "worrying so much about all the things I've got to take care of" but was receptive to supportive   Denied SI and HI
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
## 2018-05-04 NOTE — ED NOTES
Insurance Authorization: Cherie   Phone call placed to [facility] number: **    [phone]  Spoke to [place]   *3 days approved    Level of care: AIP  Review on 5/7/18  Authorization # [id number]

## 2018-05-04 NOTE — ED PROVIDER NOTES
History  Chief Complaint   Patient presents with    Psychiatric Evaluation     pt reports a long time of feeling anxious, manic depression, suicidal thoughts no plan, no HI, " i have been off meds since Aug-Sept"     26-year-old female history of depression, bipolar and opioid abuse presents to the emergency department with complaint of increasing anxiety and depression over the past few days with suicidal ideations and thoughts of killing herself via opioids, medications or illicit drugs which she has previously abused  Patient states she lost her psychiatric medication clinic last fall and has not taken any since then when she was feeling much better  Patient does not have any homicidal ideations or hallucinations not have any physical complaints at this time  Patient is willing to sign herself in for psychiatric care  Remaining ROS is negative  History provided by:  Patient   used: No    Suicidal   Presenting symptoms: suicidal thoughts    Presenting symptoms: no agitation, no suicidal threats and no suicide attempt    Degree of incapacity (severity): Moderate  Onset quality:  Gradual  Timing:  Constant  Progression:  Worsening  Chronicity:  Recurrent  Context: noncompliance    Treatment compliance:  Untreated  Relieved by: Antidepressants and mood stabilizers  Associated symptoms: no abdominal pain and no chest pain    Risk factors: hx of mental illness        Prior to Admission Medications   Prescriptions Last Dose Informant Patient Reported? Taking? Linaclotide 290 MCG CAPS   No No   Sig: Take 1 capsule by mouth daily Indications: Constipation caused by Irritable Bowel Syndrome  carBAMazepine (TEGretol XR) 100 mg 12 hr tablet   No No   Sig: Take 1 tablet by mouth daily Indications: Manic-Depression  At 9AM   carBAMazepine (TEGretol XR) 100 mg 12 hr tablet   No No   Sig: Take 2 tablets by mouth daily after dinner Indications: Manic-Depression   At [facility]   cyclobenzaprine (FLEXERIL) 10 mg tablet   No No   Sig: Take 1 tablet by mouth 3 (three) times a day as needed for muscle spasms Indications: Muscle Spasm  gabapentin (NEURONTIN) 300 mg capsule   No No   Sig: Take 1 capsule by mouth 3 (three) times a day Indications: Anxiety  At 9AM, 4PM, and 9PM   hydrOXYzine HCL (ATARAX) 25 mg tablet   No No   Sig: Take 1 tablet by mouth every 6 (six) hours as needed for anxiety Indications: Anxiety Neurosis  melatonin 3 mg   No No   Sig: Take 2 tablets by mouth daily at bedtime Indications: Trouble Sleeping  methadone (DOLOPHINE) 5 mg tablet   Yes Yes   Sig: Take by mouth every 6 (six) hours as needed for moderate pain   naproxen (NAPROSYN) 250 mg tablet   No No   Sig: Take 1 tablet by mouth 2 (two) times a day with meals Indications: Pain  traZODone (DESYREL) 100 mg tablet   No No   Sig: Take 2 tablets by mouth daily at bedtime Indications: Trouble Sleeping  Facility-Administered Medications: None       Past Medical History:   Diagnosis Date    Anxiety     Bipolar disorder ([facility] [address] )     Depression     Hypertension     Psychiatric disorder     PTSD (post-traumatic stress disorder)        Past Surgical History:   Procedure Laterality Date    HAND SURGERY Right        History reviewed  No pertinent family history  I have reviewed and agree with the history as documented  Social History   Substance Use Topics    Smoking status: Current Every Day Smoker     Packs/day: 1 00     Years: 15 00    Smokeless tobacco: Not on file    Alcohol use No        Review of Systems   Constitutional: Negative for chills and fever  HENT: Negative for sore throat  Eyes: Negative for visual disturbance  Respiratory: Negative for chest tightness, shortness of breath and wheezing  Cardiovascular: Negative for chest pain  Gastrointestinal: Negative for abdominal pain, blood in stool, constipation, diarrhea, nausea and vomiting  Genitourinary: Negative for dysuria and hematuria  Musculoskeletal: Negative for arthralgias and myalgias  Skin: Negative for color change  Neurological: Negative for light-headedness  Hematological: Negative for adenopathy  Psychiatric/Behavioral: Positive for suicidal ideas  Negative for agitation and behavioral problems  All other systems reviewed and are negative  Physical Exam  ED Triage Vitals [05/03/18 1936]   Temperature Pulse Respirations Blood Pressure SpO2   97 6 °F (36 4 °C) 91 18 155/75 96 %      Temp Source Heart Rate Source Patient Position - Orthostatic VS BP Location FiO2 (%)   Oral Monitor Sitting Right arm --      Pain Score       No Pain           Orthostatic Vital Signs  Vitals:    05/03/18 1936 05/04/18 0245 05/04/18 0800   BP: 155/75 146/76 127/64   Pulse: 91 94 64   Patient Position - Orthostatic VS: Sitting Sitting Lying       Physical Exam   Constitutional: She is oriented to person, place, and time  She appears well-developed and well-nourished  No distress  HENT:   Head: Normocephalic and atraumatic  Eyes: Conjunctivae and EOM are normal  No scleral icterus  Neck: Normal range of motion  Neck supple  Cardiovascular: Normal rate, regular rhythm and normal heart sounds  No murmur heard  Pulmonary/Chest: Effort normal and breath sounds normal  No respiratory distress  Abdominal: Soft  Bowel sounds are normal  There is no tenderness  Musculoskeletal: Normal range of motion  Neurological: She is alert and oriented to person, place, and time  Skin: Skin is warm and dry  Psychiatric: She has a normal mood and affect  Her behavior is normal  She expresses suicidal ideation  She expresses no homicidal ideation  She expresses suicidal plans  She expresses no homicidal plans  Nursing note and vitals reviewed        ED Medications  Medications   carBAMazepine (TEGretol XR) 12 hr tablet 100 mg (100 mg Oral Given 5/4/18 0828)   carBAMazepine (TEGretol XR) 12 hr tablet 200 mg (not administered)   cyclobenzaprine (FLEXERIL) tablet 10 mg (10 mg Oral Given 5/4/18 0251)   gabapentin (NEURONTIN) capsule 300 mg (300 mg Oral Given 5/4/18 0828)   melatonin tablet 6 mg (6 mg Oral Given 5/4/18 0250)   naproxen (NAPROSYN) tablet 250 mg (not administered)   traZODone (DESYREL) tablet 200 mg (not administered)   acetaminophen (TYLENOL) tablet 650 mg (not administered)   aluminum-magnesium hydroxide-simethicone (MYLANTA) 200-200-20 mg/5 mL oral suspension 30 mL (not administered)   benztropine (COGENTIN) injection 1 mg (not administered)   benztropine (COGENTIN) tablet 1 mg (not administered)   haloperidol (HALDOL) tablet 5 mg (5 mg Oral Given 5/4/18 0251)   haloperidol lactate (HALDOL) injection 5 mg (not administered)   hydrOXYzine HCL (ATARAX) tablet 25 mg (25 mg Oral Given 5/4/18 0250)   magnesium hydroxide (MILK OF MAGNESIA) 400 mg/5 mL oral suspension 30 mL (not administered)   nicotine polacrilex (NICORETTE) gum 4 mg (not administered)   nicotine (NICODERM CQ) 21 mg/24 hr TD 24 hr patch 21 mg (not administered)       Diagnostic Studies  Results Reviewed     Procedure Component Value Units Date/Time    Rapid drug screen, urine [[id number]]  (Abnormal) Collected:  05/03/18 2019    Lab Status:  Final result Specimen:  Urine from Urine, Clean Catch Updated:  05/03/18 2043     Amph/Meth UR Negative     Barbiturate Ur Negative     Benzodiazepine Urine Negative     Cocaine Urine Negative     Methadone Urine Positive (A)     Opiate Urine Positive (A)     PCP Ur Negative     THC Urine Negative    Narrative:         Presumptive report  If requested, specimen will be sent to reference lab for confirmation  FOR MEDICAL PURPOSES ONLY  IF CONFIRMATION NEEDED PLEASE CONTACT THE LAB WITHIN 5 DAYS      Drug Screen Cutoff Levels:  AMPHETAMINE/METHAMPHETAMINES  1000 ng/mL  BARBITURATES     200 ng/mL  BENZODIAZEPINES     200 ng/mL  COCAINE      300 ng/mL  METHADONE      300 ng/mL  OPIATES      300 ng/mL  PHENCYCLIDINE     25 ng/mL  THC       50 ng/mL POCT alcohol breath test [[id number]]  (Normal) Resulted:  05/03/18 1953    Lab Status:  Final result Updated:  05/03/18 1953     EXTBreath Alcohol 0 000                 No orders to display         Procedures  Procedures      Phone Consults  ED Phone Contact    ED Course  ED Course as of May 04 0857   Thu May 03, 2018   218 50year-old with bipolar and depression coming in for suicidal ideation willing to sign 201, crisis will see her  2051 METHADONE URINE: (!) Positive   2051 OPIATE URINE: (!) Positive                               MDM  Number of Diagnoses or Management Options  Suicidal ideations: new and requires workup  Diagnosis management comments: 22-year-old female presenting with suicidal ideation, will obtain bat in UDS and have crisis see the patient for placement  Amount and/or Complexity of Data Reviewed  Clinical lab tests: ordered and reviewed  Tests in the medicine section of CPT®: ordered and reviewed  Review and summarize past medical records: yes  Discuss the patient with other providers: yes      CritCare Time    Disposition  Final diagnoses:   Suicidal ideations     Time reflects when diagnosis was documented in both MDM as applicable and the Disposition within this note     Time User Action Codes Description Comment    5/4/2018  2:40 AM Wily Elder Add [F32 9] Major depressive disorder     5/4/2018  8:57 AM Darvin Mcintosh Add [R45 851] Suicidal ideations       ED Disposition     ED Disposition Condition Comment    Transfer to [facility]  Transfer to behavior health under the care of Dr Otf Serrano MD Documentation      Most Recent Value   Accepting Physician  Lucas Name, Jeremy Ramirez    Sending MD  Harris [place]       RN Documentation      Most [address] [facility] Name, Jeremy Ramirez       Follow-up Information    None       Current Discharge Medication List      CONTINUE these medications which have NOT CHANGED    Details   methadone (DOLOPHINE) 5 mg tablet Take by mouth every 6 (six) hours as needed for moderate pain      !! carBAMazepine (TEGretol XR) 100 mg 12 hr tablet Take 1 tablet by mouth daily Indications: Manic-Depression  At [facility]: 30 tablet, Refills: 0    Associated Diagnoses: Bipolar disorder, curr episode mixed, severe, w/o psychotic features ([address] )      ! ! carBAMazepine (TEGretol XR) 100 mg 12 hr tablet Take 2 tablets by mouth daily after dinner Indications: Manic-Depression  At [facility]: 1 tablet, Refills: 0    Associated Diagnoses: Bipolar disorder, curr episode mixed, severe, w/o psychotic features ([facility])      cyclobenzaprine (FLEXERIL) 10 mg tablet Take 1 tablet by mouth 3 (three) times a day as needed for muscle spasms Indications: Muscle Spasm  Qty: 30 tablet, Refills: 0    Associated Diagnoses: Muscle spasm      gabapentin (NEURONTIN) 300 mg capsule Take 1 capsule by mouth 3 (three) times a day Indications: Anxiety  At 9AM, 4PM, and 9PM  Qty: 90 capsule, Refills: 1    Associated Diagnoses: Anxiety disorder      hydrOXYzine HCL (ATARAX) 25 mg tablet Take 1 tablet by mouth every 6 (six) hours as needed for anxiety Indications: Anxiety Neurosis  Qty: 60 tablet, Refills: 0    Associated Diagnoses: Anxiety disorder      Linaclotide 290 MCG CAPS Take 1 capsule by mouth daily Indications: Constipation caused by Irritable Bowel Syndrome  Qty: 30 capsule, Refills: 1    Associated Diagnoses: IBS (irritable bowel syndrome)      melatonin 3 mg Take 2 tablets by mouth daily at bedtime Indications: Trouble Sleeping  Qty: 60 tablet, Refills: 1    Associated Diagnoses: Insomnia      naproxen (NAPROSYN) 250 mg tablet Take 1 tablet by mouth 2 (two) times a day with meals Indications: Pain  Qty: 60 tablet, Refills: 0    Associated Diagnoses: Pain      traZODone (DESYREL) 100 mg tablet Take 2 tablets by mouth daily at bedtime Indications: Trouble Sleeping    Qty: 60 tablet, Refills: 1    Associated Diagnoses: Insomnia       !! - Potential duplicate medications found  Please discuss with provider  No discharge procedures on file  ED Provider  Attending physically available and evaluated Radha Pack  I managed the patient along with the ED Attending      Electronically Signed by         Shelvia Spatz, MD  05/04/18 8294

## 2018-05-04 NOTE — PROGRESS NOTES
After being interviewed pt complained of 8 out of 10 pain in right hip  Pt did not want Tylenol and requested Flexeril and was given same  She also requested something for agitation and sleep  Pt was given Atarax, Melatonin  , and Haldol  She fell asleep about one hour later after going to bed

## 2018-05-04 NOTE — PROGRESS NOTES
Pt is a 201 for [facility] suicidal with a plan to overdose on opioids, medications, or illicit drugs  Pt has a long hx of drug abuse with her drug of choice being Heroin  She states that she go to [facility] for [facility] and receives 90 mg daily  Pt stated that she also buy opioids off the street  UDS positive for Meth and Opiates  Pt was unsure of her medications but stated they were not working  She states that she has poor memory and cannot focus  Pt stated that she wants to get treatment but has been having difficulty finding a psychiatrist   Medically she has osteoarthritics of the right hip and  Hepatitis  History of right carotid surgery and hand surgery  Last psychiatric admission was at [facility] in July of 2017  On admission patient had poor memory and was disorganized

## 2018-05-04 NOTE — TREATMENT PLAN
TREATMENT PLAN REVIEW - [address] 50 y o  1970 female MRN: [id number]    [address] [place] [address] Room / Bed: [place] [address] Encounter: [id number]        Admit Date/Time:  5/3/2018  7:41 PM    Treatment Team: Attending Provider: Yolanda Varela MD; Consulting Physician: Cheyenne Angel DO; Patient Care Technician: Laura Correa; Patient Care Technician: Estelita Pratt; Nursing Student: Mckenzie Pederson;  : Ginny Meza; Registered Nurse: Dickson Boast, RN; Patient Care Technician: Soledad Tsai; Occupational Therapy Assistant: CLARENCE Wheat    Diagnosis: Principal Problem:    Bipolar I disorder, most recent episode mixed, severe without psychotic features ([facility] )  Active Problems:    Generalized anxiety disorder    Post-traumatic stress disorder, chronic    Opioid type dependence, continuous ([facility] )    Alcohol dependence, in remission [facility])      Patient Strengths/Assets: motivated, patient is on a voluntary commitment, stable housing, supportive family      Patient Barriers/Limitations: difficulty adapting, financial instability, noncompliant with medication, noncompliant with treatment, poor physical health, substance abuse    Short Term Goals: decrease in depressive symptoms, decrease in suicidal thoughts, control of withdrawal symptoms    Long Term Goals: resolution of depressive symptoms, stabilization of mood, free of suicidal thoughts, resolution of withdrawal symptoms    Progress Towards Goals: restarting psychiatric medications as prescribed, starting opioid detoxification protocol    Recommended Treatment: medication management, patient medication education, group therapy, milieu therapy, continued Behavioral Health psychiatric evaluation/assessment process     Treatment Frequency: daily medication monitoring, group and milieu therapy daily, monitoring through interdisciplinary rounds, monitoring through weekly patient care conferences    Expected Discharge Date: 10 days - 5/14/2018    Discharge Plan: referral for outpatient medication management with a psychiatrist, referral for outpatient psychotherapy, referral to drug and alcohol rehabilitation program/counseling, return to previous living arrangement    Treatment Plan Created/Updated By: Asim Mendez MD

## 2018-05-05 NOTE — PROGRESS NOTES
Patient continues to remain in bed most of the day with painful withdrawal symptoms but said it is gradually getting better  Has been able to sleep with medication  Denies SI or HI

## 2018-05-05 NOTE — PROGRESS NOTES
Progress Note - Behavioral Health     Tayler Zhang 50 y o  female MRN: [id number]   Unit/Bed#: ZO6 993-03 Encounter: [id number]    Behavior over the last 24 hours: donna Thompson continues to feel anxious and depressed, rates depression as 7 on a scale of 1 (best mood) to 10 (worst mood), still has blunted affect and psychomotor retardation today  Still feels hopeless and would not feel safe if discharged  Limited participation in milieu  Compliant with medications  Sleep: decreased, frequent awakenings, nightmares  Appetite: decreased  Medication side effects: No   ROS: reports back pain, denies any chest pain or abdominal pain    Mental Status Evaluation:    Appearance:  casually dressed   Behavior:  cooperative, limited eye contact, psychomotor retardation   Speech:  soft, decreased volume   Mood:  depressed, anxious   Affect:  blunted   Thought Process:  organized, goal directed   Associations: intact associations   Thought Content:  no overt delusions   Perceptual Disturbances: no auditory hallucinations, no visual hallucinations   Risk Potential: Suicidal ideation - Yes, without plan, would not feel safe if discharged  Homicidal ideation - None  Potential for aggression - No   Sensorium:  oriented to person, place and time/date   Memory:  recent and remote memory grossly intact   Consciousness:  alert and awake   Attention: decreased concentration and decreased attention span   Insight:  impaired   Judgment: impaired   Gait/Station: normal gait/station and normal balance   Motor Activity: no abnormal movements     Vital signs in last 24 hours:    Temp:  [97 5 °F (36 4 °C)-97 9 °F (36 6 °C)] 97 9 °F (36 6 °C)  HR:  [74-78] 78  Resp:  [16] 16  BP: (115-131)/(69-79) 115/71    Laboratory results:  I have personally reviewed all pertinent laboratory/tests results      Lipid Profile:   Lab Results   Component Value Date    CHOL 162 05/05/2018    HDL 61 (H) 05/05/2018    TRIG 187 (H) 05/05/2018 [address] 05/05/2018   Thyroid Studies:   Lab Results   Component Value Date    [id number] 0 301 (L) 05/05/2018   RPR:   Lab Results   Component Value Date    RPR Non-Reactive 05/04/2018     Lab Results   Component Value Date    GLUF 89 05/05/2018       Progress Toward Goals: no significant progress, still anxious, remains depressed, still has suicidal thoughts    Assessment/Plan   Principal Problem:    Bipolar I disorder, most recent episode mixed, severe without psychotic features ([place] [address] )  Active Problems:    Generalized anxiety disorder    Post-traumatic stress disorder, chronic    Opioid type dependence, continuous ([place] [address] )    Alcohol dependence, in remission ([place] [address] )    Recommended Treatment:     Planned medication and treatment changes: All current active medications have been reviewed    Encourage group therapy, milieu therapy and occupational therapy  Behavioral Health checks every 5 minutes  Increase Seroquel to 200 mg at bedtime  Taper off Ativan slowly  Add Clonidine 0 1 mg tid and taper off gradually to help with opioid withdrawal symptoms  Decrease Methadone to 70 mg daily and continue taper by 10 mg per day  Check free T4, free T3, recheck TSH and CBC on Monday    Continue all other medications:    Current Facility-Administered Medications:  acetaminophen 650 mg Oral Q6H PRN Haroldo Villagomez MD   acetaminophen 975 mg Oral Q6H PRN Haroldo Villagomez MD   aluminum-magnesium hydroxide-simethicone 30 mL Oral Q4H PRN Kennedy Ledezma MD   amLODIPine 5 mg Oral Daily Haroldo Villagomez MD   benztropine 1 mg Intramuscular Q6H PRN Haroldo Villagomez MD   benztropine 1 mg Oral Q6H PRN Haroldo Villagomez MD   cloNIDine 0 1 mg Oral Q6H PRN Haroldo Villagomez MD   cloNIDine 0 1 mg Oral TID Haroldo Villagomez MD   cyclobenzaprine 10 mg Oral TID PRN Kennedy Ledezma MD   dicyclomine 10 mg Oral Q6H PRN Haroldo Villagomez MD   gabapentin 100 mg Oral TID Haroldo Villagomez MD   haloperidol 5 mg Oral Q6H PRN Haroldo Villagomez, MD   haloperidol lactate 5 mg Intramuscular Q6H PRN Marcelino Woodruff MD   hydrOXYzine HCL 25 mg Oral Q4H PRN Marcelino Woodruff MD   ibuprofen 600 mg Oral Q6H PRN Marcelino Woodruff MD   lamoTRIgine 25 mg Oral Daily Marcelino Woodruff MD   loperamide 2 mg Oral Q4H PRN Marcelino Woodruff MD   LORazepam 1 mg Intramuscular Q6H PRN Marcelino Woodruff MD   LORazepam 0 5 mg Oral TID Marcelino Woodruff MD   magnesium hydroxide 30 mL Oral Daily PRN Carlos Parra MD   [START ON 5/11/2018] methadone 10 mg Oral Daily Marcelino Woodruff MD   [START ON 5/10/2018] methadone 20 mg Oral Daily Marcelino Woodruff MD   [START ON 5/9/2018] methadone 30 mg Oral Daily Marcelino Woodruff MD   [START ON 5/8/2018] methadone 40 mg Oral Daily Marcelino Woodruff MD   [START ON 5/7/2018] methadone 50 mg Oral Daily Marcelino Woodruff MD   [START ON 5/6/2018] methadone 60 mg Oral Daily Marcelino Woodruff MD   nicotine 21 mg Transdermal Daily Marcelino Woodruff MD   nicotine polacrilex 4 mg Oral Q2H PRN Carlos Parra MD   OLANZapine 10 mg Intramuscular Q3H PRN Marcelino Woodruff MD   QUEtiapine 200 mg Oral HS Marcelino Woodruff MD   risperiDONE 1 mg Oral Q4H PRN Marcelino Woodruff MD   traZODone 100 mg Oral HS Marcelino Woodruff MD       Risks / Benefits of Treatment:    Risks, benefits, and possible side effects of medications explained to patient and patient verbalizes understanding and agreement for treatment  Risks of medications in pregnancy explained if female patient  Patient verbalizes understanding and agrees to notify her doctor if she becomes pregnant  Counseling / Coordination of Care: Total floor / unit time spent today 35 minutes  Greater than 50% of total time was spent with the patient and / or family counseling and / or coordination of care  A description of counseling / coordination of care:    Patient's progress reviewed with nursing staff    Medications, treatment progress and treatment plan reviewed with patient  Coping with ongoing anxiety and drug use problems reviewed with patient  Coping strategies including acquiring relapse prevention skills and reducing physical symptoms of anxiety reviewed with patient  Importance of medication and treatment compliance reviewed with patient  Reassurance and supportive therapy provided  Encouraged participation in milieu and group therapy on the unit

## 2018-05-05 NOTE — PLAN OF CARE
Problem: SUBSTANCE USE/ABUSE  Goal: Will have no detox symptoms and will verbalize plan for changing substance-related behavior  INTERVENTIONS:  - Monitor physical status and assess for symptoms of withdrawal  - Administer medication as ordered  - Provide emotional support with 1 on 1 interaction with staff  - Encourage recovery focused program/ addiction education  - Assess for verbalization of changing behaviors related to substance abuse  - Initiate consults and referrals as appropriate (Case Management, Spiritual Care, etc )  Outcome: Progressing    Goal: By discharge, will develop insight into their chemical dependency and sustain motivation to continue in recovery  INTERVENTIONS:  - Attends all daily group sessions and scheduled AA groups  - Actively practices coping skills through participation in the therapeutic community and adherence to program rules  - Reviews and completes assignments from individual treatment plan  - Assist patient development of understanding of their personal cycle of addiction and relapse triggers  Outcome: Progressing    Goal: By discharge, patient will have ongoing treatment plan addressing chemical dependency  INTERVENTIONS:  - Assist patient with resources and/or appointments for ongoing recovery based living  Outcome: Progressing

## 2018-05-05 NOTE — PLAN OF CARE
Problem: Depression  Goal: Treatment Goal: Demonstrate behavioral control of depressive symptoms, verbalize feelings of improved mood/affect, and adopt new coping skills prior to discharge  Outcome: Progressing    Goal: Verbalize thoughts and feelings  Interventions:  - Assess and re-assess patient's level of risk   - Engage patient in 1:1 interactions, daily, for a minimum of 15 minutes   - Encourage patient to express feelings, fears, frustrations, hopes    Outcome: Progressing    Goal: Refrain from harming self  Interventions:  - Monitor patient closely, per order   - Supervise medication ingestion, monitor effects and side effects    Outcome: Progressing    Goal: Refrain from isolation  Interventions:  - Develop a trusting relationship   - Encourage socialization    Outcome: Progressing    Goal: Refrain from self-neglect  Outcome: Progressing    Goal: Complete daily ADLs, including personal hygiene independently, as able  Interventions:  - Observe, teach, and assist patient with ADLS  -  Monitor and promote a balance of rest/activity, with adequate nutrition and elimination    Outcome: Progressing      Problem: DISCHARGE PLANNING  Goal: Discharge to home or other facility with appropriate resources  INTERVENTIONS:  - Identify barriers to discharge w/patient and caregiver  - Arrange for needed discharge resources and transportation  - Refer to Case Management Department for coordinating discharge planning if the patient needs post-hospital services based on physician/advanced practitioner order or complex needs related to functional status, cognitive ability, or social support system  propriate  - Identify discharge learning needs (meds, wound care, etc )  -   Outcome: Progressing      Problem: Ineffective Coping  Goal: Participates in unit activities  Interventions:  - Provide therapeutic environment   - Provide required programming   - Redirect inappropriate behaviors    Outcome: Progressing

## 2018-05-06 NOTE — PLAN OF CARE
Depression     Treatment Goal: Demonstrate behavioral control of depressive symptoms, verbalize feelings of improved mood/affect, and adopt new coping skills prior to discharge Progressing     Verbalize thoughts and feelings Progressing     Refrain from harming self Progressing     Refrain from isolation Progressing     Refrain from self-neglect Progressing     Complete daily ADLs, including personal hygiene independently, as able Progressing        SUBSTANCE USE/ABUSE     Will have no detox symptoms and will verbalize plan for changing substance-related behavior Progressing     By discharge, will develop insight into their chemical dependency and sustain motivation to continue in recovery Progressing     By discharge, patient will have ongoing treatment plan addressing chemical dependency Progressing

## 2018-05-06 NOTE — PROGRESS NOTES
Progress Note - Behavioral Health     [employer] 50 y o  female MRN: [id number]   Unit/Bed#: QH3 497-85 Encounter: [id number]    Behavior over the last 24 hours: donna Scott is still anxious, labile and tearful today  Preoccupied with opioid withdrawal symptoms, distressed, hopeless  Limited participation in milieu  Has been taking medications  Sleep: decreased, nightmares  Appetite: decreased  Medication side effects: No   ROS: reports back pain and restless legs, denies any shortness of breath or chest pain    Mental Status Evaluation:    Appearance:  casually dressed   Behavior:  cooperative, poor eye contact   Speech:  soft, decreased volume   Mood:  depressed, dysphoric, anxious   Affect:  blunted   Thought Process:  organized, goal directed   Associations: intact associations   Thought Content:  no overt delusions   Perceptual Disturbances: no auditory hallucinations, no visual hallucinations   Risk Potential: Suicidal ideation - Yes, without plan, would not feel safe if discharged  Homicidal ideation - None  Potential for aggression - No   Sensorium:  oriented to person, place and time/date   Memory:  recent and remote memory grossly intact   Consciousness:  alert and awake   Attention: poor concentration and poor attention span   Insight:  impaired   Judgment: impaired   Gait/Station: normal gait/station and normal balance   Motor Activity: no abnormal movements     Vital signs in last 24 hours:    Temp:  [97 8 °F (36 6 °C)-98 3 °F (36 8 °C)] 97 8 °F (36 6 °C)  HR:  [70-87] 70  Resp:  [16] 16  BP: ()/(50-81) 90/50    Laboratory results:  I have personally reviewed all pertinent laboratory/tests results      Progress Toward Goals: no improvement, still anxious, remains depressed, still suicidal    Assessment/Plan   Principal Problem:    Bipolar I disorder, most recent episode mixed, severe without psychotic features ([facility] )  Active Problems:    Generalized anxiety disorder Post-traumatic stress disorder, chronic    Opioid type dependence, continuous (HCC)    Alcohol dependence, in remission ([facility] )    Recommended Treatment:     Planned medication and treatment changes: All current active medications have been reviewed    Encourage group therapy, milieu therapy and occupational therapy  [address] checks every 5 minutes  Increase Seroquel to 300 mg at bedtime  Decrease Methadone to 60 mg daily and continue with taper by 10 mg per day    Continue all other medications:    Current Facility-Administered Medications:  acetaminophen 650 mg Oral Q6H PRN Laura Guidry MD   acetaminophen 975 mg Oral Q6H PRN Laura Guidry MD   aluminum-magnesium hydroxide-simethicone 30 mL Oral Q4H PRN Hudson Sacks, MD   amLODIPine 5 mg Oral Daily Laura Guidry MD   benztropine 1 mg Intramuscular Q6H PRN Laura Guidry MD   benztropine 1 mg Oral Q6H PRN Laura Guidry MD   cloNIDine 0 1 mg Oral Q6H PRN Laura Guidry MD   cloNIDine 0 1 mg Oral TID Laura Guidry MD   cyclobenzaprine 10 mg Oral TID PRN Hudson Sacks, MD   dicyclomine 10 mg Oral Q6H PRN Laura Guidry MD   gabapentin 100 mg Oral TID Laura Guidry MD   haloperidol 5 mg Oral Q6H PRN Laura Guidry MD   haloperidol lactate 5 mg Intramuscular Q6H PRN Laura Guidry MD   hydrOXYzine HCL 25 mg Oral Q4H PRN Laura Guidry MD   ibuprofen 600 mg Oral Q6H PRN Laura Guidry MD   lamoTRIgine 25 mg Oral Daily Laura Guidry MD   loperamide 2 mg Oral Q4H PRN Laura Guidry MD   LORazepam 1 mg Intramuscular Q6H PRN Laura Guidry MD   LORazepam 0 5 mg Oral TID Laura Guidry MD   magnesium hydroxide 30 mL Oral Daily PRN Hudson Sacks, MD   [START ON 5/11/2018] methadone 10 mg Oral Daily Laura Guidry MD   [START ON 5/10/2018] methadone 20 mg Oral Daily Laura Guidry MD   [START ON 5/9/2018] methadone 30 mg Oral Daily Laura Guidry MD   [START ON 5/8/2018] methadone 40 mg Oral Daily Annett Peabody, MD   [START ON 5/7/2018] methadone 50 mg Oral Daily Annett Peabody, MD   nicotine 21 mg Transdermal Daily Annett Peabody, MD   nicotine polacrilex 4 mg Oral Q2H PRN Gokul Carranza MD   OLANZapine 10 mg Intramuscular Q3H PRN Annett Peabody, MD   QUEtiapine 300 mg Oral HS Annett Peabody, MD   risperiDONE 1 mg Oral Q4H PRN Annett Peabody, MD   traZODone 100 mg Oral HS Annett Peabody, MD       Risks / Benefits of Treatment:    Risks, benefits, and possible side effects of medications explained to patient and patient verbalizes understanding and agreement for treatment  Risks of medications in pregnancy explained if female patient  Patient verbalizes understanding and agrees to notify her doctor if she becomes pregnant  Counseling / Coordination of Care:    Patient's progress reviewed with nursing staff  Medications, treatment progress and treatment plan reviewed with patient  Medication changes discussed with patient

## 2018-05-06 NOTE — PROGRESS NOTES
Patient still struggling withdrawal symptoms (given prn ibuprofen and later Restoril) but is insightful in conversation about her struggles with addiction and her determination to get completely clean--"I have wasted too much of my life on this "

## 2018-05-06 NOTE — PROGRESS NOTES
PT states to Rn when asked if she has any self harm thoughts " No I don't currently " Pt asked to elaborate  PT stated " It's not that I want to hurt myself  I just don't want to be here not like just in this place just in general  I have so much to deal with  The thought comes but it goes quick  I want to work on myself and getting off methadone  " Pt has good insight  PT is calm and pleasant  PT still feels depressed

## 2018-05-06 NOTE — PLAN OF CARE
Problem: Depression  Goal: Treatment Goal: Demonstrate behavioral control of depressive symptoms, verbalize feelings of improved mood/affect, and adopt new coping skills prior to discharge  Outcome: Progressing    Goal: Verbalize thoughts and feelings  Interventions:  - Assess and re-assess patient's level of risk   - Engage patient in 1:1 interactions, daily, for a minimum of 15 minutes   - Encourage patient to express feelings, fears, frustrations, hopes    Outcome: Progressing    Goal: Refrain from harming self  Interventions:  - Monitor patient closely, per order   - Supervise medication ingestion, monitor effects and side effects    Outcome: Progressing    Goal: Refrain from isolation  Interventions:  - Develop a trusting relationship   - Encourage socialization    Outcome: Progressing    Goal: Refrain from self-neglect  Outcome: Progressing    Goal: Complete daily ADLs, including personal hygiene independently, as able  Interventions:  - Observe, teach, and assist patient with ADLS  -  Monitor and promote a balance of rest/activity, with adequate nutrition and elimination    Outcome: Progressing      Problem: DISCHARGE PLANNING  Goal: Discharge to home or other facility with appropriate resources  INTERVENTIONS:  - Identify barriers to discharge w/patient and caregiver  - Arrange for needed discharge resources and transportation  - Refer to Case Management Department for coordinating discharge planning if the patient needs post-hospital services based on physician/advanced practitioner order or complex needs related to functional status, cognitive ability, or social support system  propriate  - Identify discharge learning needs (meds, wound care, etc )  -   Outcome: Progressing      Problem: Ineffective Coping  Goal: Participates in unit activities  Interventions:  - Provide therapeutic environment   - Provide required programming   - Redirect inappropriate behaviors    Outcome: Progressing      Problem: SUBSTANCE USE/ABUSE  Goal: Will have no detox symptoms and will verbalize plan for changing substance-related behavior  INTERVENTIONS:  - Monitor physical status and assess for symptoms of withdrawal  - Administer medication as ordered  - Provide emotional support with 1 on 1 interaction with staff  - Encourage recovery focused program/ addiction education  - Assess for verbalization of changing behaviors related to substance abuse  - Initiate consults and referrals as appropriate (Case Management, Spiritual Care, etc )   Outcome: Progressing    Goal: By discharge, will develop insight into their chemical dependency and sustain motivation to continue in recovery  INTERVENTIONS:  - Attends all daily group sessions and scheduled AA groups  - Actively practices coping skills through participation in the therapeutic community and adherence to program rules  - Reviews and completes assignments from individual treatment plan  - Assist patient development of understanding of their personal cycle of addiction and relapse triggers   Outcome: Progressing    Goal: By discharge, patient will have ongoing treatment plan addressing chemical dependency  INTERVENTIONS:  - Assist patient with resources and/or appointments for ongoing recovery based living   Outcome: Progressing

## 2018-05-07 NOTE — PROGRESS NOTES
Progress Note - Behavioral Health     iWlfred Goel 50 y o  female MRN: [id number]   Unit/Bed#: GV1 404-14 Encounter: [id number]    Behavior over the last 24 hours: unchanged  [employer] continues to feel anxious, depressed and hopeless, still has blunted affect and psychomotor retardation, still endorses suicidal thoughts and reports multiple plans today to overdose on medications, suffocate self or walk into traffic  Does not want to attend groups  Limited participation in milieu  Compliant with medications  Focusing on opioid withdrawal symptoms      Sleep: slept off and on, frequent awakenings, nightmares  Appetite: decreased  Medication side effects: No   ROS: reports back pain, constipation, muscle aches and restless legs, denies any shortness of breath or chest pain    Mental Status Evaluation:    Appearance:  casually dressed   Behavior:  cooperative, no eye contact, psychomotor retardation   Speech:  soft, decreased volume   Mood:  depressed, dysphoric, anxious   Affect:  blunted   Thought Process:  organized, concrete   Associations: concrete associations   Thought Content:  no overt delusions   Perceptual Disturbances: no auditory hallucinations, no visual hallucinations   Risk Potential: Suicidal ideation - Yes, with plan to overdose on medications, suffocate self or walk into traffic  Homicidal ideation - None  Potential for aggression - No   Sensorium:  oriented to person, place and time/date   Memory:  recent and remote memory grossly intact   Consciousness:  alert and awake   Attention: decreased concentration and decreased attention span   Insight:  impaired   Judgment: impaired   Gait/Station: normal gait/station and normal balance   Motor Activity: no abnormal movements     Vital signs in last 24 hours:    Temp:  [97 9 °F (36 6 °C)-98 1 °F (36 7 °C)] 98 1 °F (36 7 °C)  HR:  [69-91] 69  Resp:  [16] 16  BP: (114-118)/(64-90) 114/64    Laboratory results:  I have personally reviewed all pertinent laboratory/tests results  CBC:   Lab Results   Component Value Date    WBC 4 13 (L) 05/07/2018    RBC 4 56 05/07/2018    HGB 13 4 05/07/2018    HCT 41 4 05/07/2018    MCV 91 05/07/2018     (L) 05/07/2018    MCH 29 4 05/07/2018    MCHC 32 4 05/07/2018    RDW 12 4 05/07/2018    MPV 12 1 05/07/2018    NRBC 0 05/07/2018    NEUTROABS 1 89 05/07/2018   Thyroid Studies:   Lab Results   Component Value Date    GHN9EPVLHZMU 1 060 05/07/2018    T3FREE 2 28 (L) 05/07/2018    FREET4 0 70 (L) 05/07/2018       Progress Toward Goals: no progress, still anxious, continues to feel depressed, still has suicidal thoughts    Assessment/Plan   Principal Problem:    Bipolar I disorder, most recent episode mixed, severe without psychotic features ([facility] )  Active Problems:    Generalized anxiety disorder    Post-traumatic stress disorder, chronic    [facility] )    Alcohol dependence, in remission ([facility] )    Recommended Treatment:     Planned medication and treatment changes: All current active medications have been reviewed    Encourage group therapy, milieu therapy and occupational therapy  Behavioral Health checks every 5 minutes  Consult medical service due to IBS symptoms, low T4/T3 and low platelets  Increase Seroquel to 350 mg at bedtime and titrate dose  Titrate Lamictal  Decrease Methadone to 50 mg daily and continue with taper by 10 mg per day    Continue all other medications:    Current Facility-Administered Medications:  acetaminophen 650 mg Oral Q6H PRN Vonda Reilly MD   acetaminophen 975 mg Oral Q6H PRN Vonda Reilly MD   aluminum-magnesium hydroxide-simethicone 30 mL Oral Q4H PRN Yenny Hough MD   amLODIPine 5 mg Oral Daily Vonda Reilly MD   benztropine 1 mg Intramuscular Q6H PRN Vonda Reilly MD   benztropine 1 mg Oral Q6H PRN Vonda Reilly MD   cloNIDine 0 1 mg Oral Q6H PRN Vonda Reilly MD   cloNIDine 0 1 mg Oral TID Vonda Reilly MD cyclobenzaprine 10 mg Oral TID PRN Vona Harada, MD   dicyclomine 10 mg Oral Q6H PRN Darnell Stevenson MD   docusate sodium 100 mg Oral HS PRN Darnell Stevenson MD   fluticasone 1 spray Nasal Daily Darnell Stevenson MD   gabapentin 200 mg Oral TID Darnell Stevenson MD   haloperidol 5 mg Oral Q6H PRN Darnell Stevenson MD   haloperidol lactate 5 mg Intramuscular Q6H PRN Darnell Stevenson MD   hydrOXYzine HCL 25 mg Oral Q4H PRN Darnell Stevenson MD   ibuprofen 600 mg Oral Q6H PRN Darnell Stevenson MD   lamoTRIgine 25 mg Oral Daily Darnell Stevenson MD   Linaclotide 290 mcg Oral Daily Darnell Stevenson MD   loperamide 2 mg Oral Q4H PRN Darnell Stevenson MD   LORazepam 1 mg Intramuscular Q6H PRN Darnell Stevenson MD   LORazepam 0 5 mg Oral TID Darnell Stevenson MD   magnesium hydroxide 30 mL Oral Daily PRN Vona Harada, MD   [START ON 5/11/2018] methadone 10 mg Oral Daily Darnell Stevenson MD   [START ON 5/10/2018] methadone 20 mg Oral Daily Darnell Stevenson MD   [START ON 5/9/2018] methadone 30 mg Oral Daily Darnell Stevenson MD   [START ON 5/8/2018] methadone 40 mg Oral Daily Darnell Stevenson MD   nicotine 21 mg Transdermal Daily Darnell Stevenson MD   nicotine polacrilex 4 mg Oral Q2H PRN Vona Harada, MD   OLANZapine 10 mg Intramuscular Q3H PRN Darnell Stevenson MD   QUEtiapine 350 mg Oral HS Darnell Stevenson MD   risperiDONE 1 mg Oral Q4H PRN Darnell Stevenson MD   traZODone 100 mg Oral HS Darnell Stevenson MD       Risks / Benefits of Treatment:    Risks, benefits, and possible side effects of medications explained to patient and patient verbalizes understanding and agreement for treatment  Risks of medications in pregnancy explained if female patient  Patient verbalizes understanding and agrees to notify her doctor if she becomes pregnant  Counseling / Coordination of Care:    Patient's progress discussed with staff in treatment team meeting    Medications, treatment progress and treatment plan reviewed with patient  Medication changes discussed with patient

## 2018-05-07 NOTE — PROGRESS NOTES
Pt denies SI but appears depressed  She also complains of withdrawal still, stating it is non specific, generalized body aches  Pt has been seclusive and withdrawn to her room  Will encourage pt to be OOB

## 2018-05-08 PROBLEM — R79.89 LOW T4: Status: ACTIVE | Noted: 2018-01-01

## 2018-05-08 PROBLEM — K58.9 IRRITABLE BOWEL SYNDROME (IBS): Status: ACTIVE | Noted: 2018-01-01

## 2018-05-08 PROBLEM — D69.6 THROMBOCYTOPENIA (HCC): Status: ACTIVE | Noted: 2018-01-01

## 2018-05-08 PROBLEM — R94.6 BORDERLINE ABNORMAL THYROID FUNCTION TEST: Status: ACTIVE | Noted: 2018-01-01

## 2018-05-08 NOTE — CASE MANAGEMENT
Rec'd message from pt's daughter, saying that pt had a violation of parole arraignment hearing on 5/10 in [facility]  Said a letter needed to be faxed to confirm pt's admission  SHERRELL met with pt  Discussed above  Pt questioned why she rec'd 2 letters from [facility] court  Said she had permission to leave [facility] & come to [place] 3 yrs ago  Said she was to have her case closed eff 5/9/2018  Aware she owes fines  Owes $93 60 for her 1st case  Said she owed over $2300  For 2nd case  Said her daughter was going to pay for the 1st case  Pt questioned why letters were sent now  Has a state , Prisca Godinez, [phone]  Signed ROIs for PO, [place] Rosa Harper, & daughter, Cynthia Solis, [phone]  SW called 's office, [phone]  Spoke to Ritesh, sec'y, who requested that SW fax a letter confirming pt's admission  SW agreed to do so  When SHERRELL tried to do letter, computer program (MS Word) would not open  SHERRELL called IT   Spoke to Aaron Martin, who said he'd send a tech to open program  Rec'd call from Shanell Weiner he'd send someone to help open program

## 2018-05-08 NOTE — ASSESSMENT & PLAN NOTE
-Pt reports she is not currently on antiviral therapy  -Can follow up as OP with her GI physician Dr Hung Alan as well as her PCP

## 2018-05-08 NOTE — PROGRESS NOTES
Progress Note - [address] 50 y o  female MRN: [id number]   Unit/Bed#: NW7 762-01 Encounter: [id number]    Behavior over the last 24 hours: unchanged  Michele Lazo is still anxious, depressed and hopeless, remains preoccupied with methadone taper  Also just found out that she has some outstanding charges from [place] for violation of parole - concerned about that  She but still has suicidal thoughts, but denies suicidal plans today  Limited participation in milieu  Compliant with medications  Sleep: decreased, frequent awakenings, nightmares  Appetite: improving  Medication side effects: No   ROS: reports back pain, constipation, muscle aches and restless legs, denies any shortness of breath or chest pain    Mental Status Evaluation:    Appearance:  casually dressed   Behavior:  cooperative, poor eye contact   Speech:  soft   Mood:  depressed, anxious   Affect:  blunted   Thought Process:  organized, concrete   Associations: concrete associations   Thought Content:  no overt delusions   Perceptual Disturbances: no auditory hallucinations, no visual hallucinations   Risk Potential: Suicidal ideation - Yes, without plan  Homicidal ideation - None  Potential for aggression - No   Sensorium:  oriented to person, place and time/date   Memory:  recent and remote memory grossly intact   Consciousness:  alert and awake   Attention: decreased concentration and decreased attention span   Insight:  impaired   Judgment: impaired   Gait/Station: normal gait/station and normal balance   Motor Activity: no abnormal movements     Vital signs in last 24 hours:    Temp:  [97 5 °F (36 4 °C)-98 2 °F (36 8 °C)] 97 5 °F (36 4 °C)  HR:  [64-90] 64  Resp:  [16] 16  BP: (123-126)/(73-80) 125/77    Laboratory results:  I have personally reviewed all pertinent laboratory/tests results      Thyroid Studies:   Lab Results   Component Value Date    [id number] 1 690 05/08/2018    T3FREE 1 98 (L) 05/08/2018    FREET4 0 66 (L) 05/08/2018       Progress Toward Goals: no significant improvement, still anxious, remains depressed, still suicidal    Assessment/Plan   Principal Problem:    Bipolar I disorder, most recent episode mixed, severe without psychotic features ([facility] )  Active Problems:    Opioid type dependence, continuous (HCC)    Chronic hepatitis C without hepatic coma (HCC)    Thrombocytopenia (HCC)    Borderline abnormal thyroid function test    Irritable bowel syndrome (IBS)    Recommended Treatment:     Planned medication and treatment changes: All current active medications have been reviewed    Encourage group therapy, milieu therapy and occupational therapy  [address] checks every 5 minutes  Appreciate medical input - thyroid studies repeated  Increase Seroquel to 400 mg at bedtime  Decrease ativan to 0 5 mg bid and taper off gradually  Decrease Methadone to 40 mg daily and continue with taper  Titrate Lamictal    Continue all other medications:    Current Facility-Administered Medications:  acetaminophen 650 mg Oral Q6H PRN Taras Oppenheim, MD   acetaminophen 975 mg Oral Q6H PRN Taras Oppenheim, MD   aluminum-magnesium hydroxide-simethicone 30 mL Oral Q4H PRN Afshan Parsons MD   amLODIPine 5 mg Oral Daily Taras Oppenheim, MD   benztropine 1 mg Intramuscular Q6H PRN Taras Oppenheim, MD   benztropine 1 mg Oral Q6H PRN Taras Oppenheim, MD   cloNIDine 0 1 mg Oral Q6H PRN Taras Oppenheim, MD   cloNIDine 0 1 mg Oral TID Taras Oppenheim, MD   cyclobenzaprine 10 mg Oral TID PRN Afshan Parsons MD   dicyclomine 10 mg Oral Q6H PRN Taras Oppenheim, MD   docusate sodium 100 mg Oral HS PRN Taras Oppenheim, MD   fluticasone 1 spray Nasal Daily Taras Oppenheim, MD   gabapentin 200 mg Oral TID Taras Oppenheim, MD   haloperidol 5 mg Oral Q6H PRN Taras Oppenheim, MD   haloperidol lactate 5 mg Intramuscular Q6H PRN Taras Oppenheim, MD   hydrOXYzine HCL 25 mg Oral Q4H PRN Taras Oppenheim, MD   ibuprofen 600 mg Oral Q6H PRN Gokul Yousif MD   lamoTRIgine 25 mg Oral Daily Gokul Yousif MD   Linaclotide 290 mcg Oral Daily Gokul Yousif MD   loperamide 2 mg Oral Q4H PRN Gokul Yousif MD   LORazepam 1 mg Intramuscular Q6H PRN Gokul Yousif MD   LORazepam 0 5 mg Oral BID Gokul Yousif MD   magnesium hydroxide 30 mL Oral Daily PRN Kwabena De Guzman MD   [START ON 5/11/2018] methadone 10 mg Oral Daily Gokul Yousif MD   [START ON 5/10/2018] methadone 20 mg Oral Daily Gokul Yousif MD   [START ON 5/9/2018] methadone 30 mg Oral Daily Gokul Yousif MD   nicotine 21 mg Transdermal Daily Gokul Yousif MD   nicotine polacrilex 4 mg Oral Q2H PRN Kwabena De Guzman MD   OLANZapine 10 mg Intramuscular Q3H PRN Gokul Yousif MD   QUEtiapine 400 mg Oral HS Gokul Yousif MD   risperiDONE 1 mg Oral Q4H PRN Gokul Yousif MD   traZODone 100 mg Oral HS Gokul Yousif MD       Risks / Benefits of Treatment:    Risks, benefits, and possible side effects of medications explained to patient and patient verbalizes understanding and agreement for treatment  Risks of medications in pregnancy explained if female patient  Patient verbalizes understanding and agrees to notify her doctor if she becomes pregnant  Counseling / Coordination of Care:    Patient's progress discussed with staff in treatment team meeting  Medications, treatment progress and treatment plan reviewed with patient  Medication changes discussed with patient

## 2018-05-08 NOTE — PROGRESS NOTES
PT denies Si  PT states she is depressed and is stressed out w/ the fines she has to pay  PT states her daughter is helping her and supporting her  Pt is happy to be titrated off of methadone   Pt states she feels better but still has some W/D s/s  PT is pleasant and cooperative

## 2018-05-08 NOTE — ASSESSMENT & PLAN NOTE
-Continue current tx  -Monitor BPs while on clonidine which can lower pressures, especially in conjunction with amlodipine

## 2018-05-08 NOTE — PROGRESS NOTES
Patient spent time making phone calls regarding a notice she received  About an upcoming court date  Also looking forward to tomorrow being her last day of parole  Pleasant on approach  Denies SI and compliant with medications  Will monitor

## 2018-05-08 NOTE — PLAN OF CARE
Problem: Depression  Goal: Treatment Goal: Demonstrate behavioral control of depressive symptoms, verbalize feelings of improved mood/affect, and adopt new coping skills prior to discharge  Outcome: Progressing    Goal: Verbalize thoughts and feelings  Interventions:  - Assess and re-assess patient's level of risk   - Engage patient in 1:1 interactions, daily, for a minimum of 15 minutes   - Encourage patient to express feelings, fears, frustrations, hopes    Outcome: Progressing    Goal: Refrain from harming self  Interventions:  - Monitor patient closely, per order   - Supervise medication ingestion, monitor effects and side effects    Outcome: Progressing    Goal: Refrain from isolation  Interventions:  - Develop a trusting relationship   - Encourage socialization    Outcome: Progressing    Goal: Refrain from self-neglect  Outcome: Progressing    Goal: Complete daily ADLs, including personal hygiene independently, as able  Interventions:  - Observe, teach, and assist patient with ADLS  -  Monitor and promote a balance of rest/activity, with adequate nutrition and elimination    Outcome: Progressing      Problem: DISCHARGE PLANNING  Goal: Discharge to home or other facility with appropriate resources  INTERVENTIONS:  - Identify barriers to discharge w/patient and caregiver  - Arrange for needed discharge resources and transportation  - Refer to Case Management Department for coordinating discharge planning if the patient needs post-hospital services based on physician/advanced practitioner order or complex needs related to functional status, cognitive ability, or social support system  propriate  - Identify discharge learning needs (meds, wound care, etc )  -   Outcome: Progressing      Problem: Ineffective Coping  Goal: Participates in unit activities  Interventions:  - Provide therapeutic environment   - Provide required programming   - Redirect inappropriate behaviors    Outcome: Not Progressing      Problem: SUBSTANCE USE/ABUSE  Goal: Will have no detox symptoms and will verbalize plan for changing substance-related behavior  INTERVENTIONS:  - Monitor physical status and assess for symptoms of withdrawal  - Administer medication as ordered  - Provide emotional support with 1 on 1 interaction with staff  - Encourage recovery focused program/ addiction education  - Assess for verbalization of changing behaviors related to substance abuse  - Initiate consults and referrals as appropriate (Case Management, Spiritual Care, etc )   Outcome: Progressing    Goal: By discharge, will develop insight into their chemical dependency and sustain motivation to continue in recovery  INTERVENTIONS:  - Attends all daily group sessions and scheduled AA groups  - Actively practices coping skills through participation in the therapeutic community and adherence to program rules  - Reviews and completes assignments from individual treatment plan  - Assist patient development of understanding of their personal cycle of addiction and relapse triggers   Outcome: Progressing    Goal: By discharge, patient will have ongoing treatment plan addressing chemical dependency  INTERVENTIONS:  - Assist patient with resources and/or appointments for ongoing recovery based living   Outcome: Progressing

## 2018-05-08 NOTE — ASSESSMENT & PLAN NOTE
-TSH wnl, FT4 and FT3 borderline low  -possibly r/t recent carbamazepine use which was discontinued on 5/4  -Recheck TFTs, if FT4/FT3 significantly decreased pt may require further w/u as OP

## 2018-05-08 NOTE — CONSULTS
Consult- Radha Pack 1970, 50 y o  female MRN: [id number]    Unit/Bed#: KE2 762-01 Encounter: [id number]    Primary Care Provider: Aristeo Sunshine MD   Date and time admitted to hospital: 5/3/2018  7:41 PM      Inpatient consult to Internal Medicine  Consult performed by: Garett Russ  Consult ordered by: Carolina Stanford          * Bipolar I disorder, most recent episode mixed, severe without psychotic features ([facility] )   Assessment & Plan    -Mgmt as per psychiatry         Opioid type dependence, continuous ([facility] )   Assessment & Plan    -Continue current tx  -Monitor BPs while on clonidine which can lower pressures, especially in conjunction with amlodipine           Chronic hepatitis C without hepatic coma ([facility] )   Assessment & Plan    -Pt reports she is not currently on antiviral therapy  -Can follow up as OP with her GI physician Dr Arturo Bell as well as her PCP              Thrombocytopenia ([place] [id number] )   Assessment & Plan    -Stable, likely r/t chronic hepatitis C           Borderline abnormal thyroid function test   Assessment & Plan    -TSH wnl, FT4 and FT3 borderline low  -possibly r/t recent carbamazepine use which was discontinued on 5/4  -Recheck TFTs, if FT4/FT3 significantly decreased pt may require further w/u as OP        Irritable bowel syndrome (IBS)   Assessment & Plan    - Reports soft, regular bowel movements with recent initiation of Linzess, some abdominal cramping at times  -Continue Linzess   -Colace prn for constipation, MoM and Imodium for diarrhea   -Bentyl prn for abdominal cramping             VTE Prophylaxis: Reason for no pharmacologic prophylaxis low risk ambulate pt   / reason for no mechanical VTE prophylaxis low risk ambulate pt      Recommendations for Discharge:  · D/c planning as per psychiatry team     Counseling / Coordination of Care Time: 30 minutes  Greater than 50% of total time spent on patient counseling and coordination of care  Collaboration of Care:  Were Recommendations Directly Discussed with Primary Treatment Team? - No     History of Present Illness:    Guille Chacon is a 50 y o  female who is originally admitted to the psychiatry service due to [facility] and manic depression  We are consulted for medical management  Pt reports she is currently going through withdrawal from opiates so feels generally unwell but offers no specific complaints  In terms of her IBS she recently started Linzess and reports significant improvement in her symptoms  Pt reports she follows with her PCP for chronic hepatitis C, and currently has no plans to start antiviral therapy  Review of Systems:    Review of Systems   Constitutional: Positive for fatigue  Negative for activity change, appetite change, chills and unexpected weight change  HENT: Negative for congestion and rhinorrhea  Eyes: Negative for photophobia and visual disturbance  Respiratory: Negative for cough, chest tightness and shortness of breath  Cardiovascular: Negative for chest pain, palpitations and leg swelling  Gastrointestinal: Negative for abdominal distention, abdominal pain, constipation, diarrhea, nausea and vomiting  Genitourinary: Negative for dysuria  Musculoskeletal: Positive for arthralgias  Skin: Negative for rash  Neurological: Negative for dizziness, tremors, syncope and light-headedness         Past Medical and Surgical History:     Past Medical History:   Diagnosis Date    Abnormal menses     Gastroparesis     Hallux valgus     Hepatitis C     Hypertension     IBS (irritable bowel syndrome)     Kidney abscess     Kidney infection     PTSD (post-traumatic stress disorder)     Renal infarction (HCC)     Rosacea     UTI (urinary tract infection)     Vulvar ulceration        Past Surgical History:   Procedure Laterality Date     SECTION      HAND SURGERY Right        Meds/Allergies:    all medications and allergies reviewed    Allergies: No Known Allergies    Social History:     Marital Status: Single    Substance Use History:   History   Alcohol Use No     History   Smoking Status    Current Every Day Smoker    Packs/day: 1 00    Years: 15 00   Smokeless Tobacco    Not on file     History   Drug Use    Types: Oxycodone, Other     Comment: previous history  Goes weekly for therapy       Family History:    History reviewed  No pertinent family history  Physical Exam:     Vitals:   Blood Pressure: 123/80 (05/07/18 2026)  Pulse: 90 (05/07/18 2026)  Temperature: 98 2 °F (36 8 °C) (05/07/18 1539)  Temp Source: Oral (05/07/18 1539)  Respirations: 16 (05/07/18 2026)  Height: 5' 3" (160 cm) (05/04/18 0245)  Weight - Scale: 61 2 kg (135 lb) (05/04/18 0245)  SpO2: 96 % (05/03/18 1936)    Physical Exam   Constitutional: She is oriented to person, place, and time  She appears well-developed and well-nourished  No distress  HENT:   Head: Normocephalic and atraumatic  Mouth/Throat: Oropharynx is clear and moist  No oropharyngeal exudate  Eyes: Conjunctivae and EOM are normal  Pupils are equal, round, and reactive to light  Right eye exhibits no discharge  Left eye exhibits no discharge  Neck: Normal range of motion  Neck supple  Cardiovascular: Normal rate, regular rhythm, normal heart sounds and intact distal pulses  No murmur heard  Pulmonary/Chest: Effort normal and breath sounds normal  She has no wheezes  She has no rales  She exhibits no tenderness  Abdominal: Soft  Bowel sounds are normal  She exhibits no distension  There is no tenderness  There is no guarding  Musculoskeletal: Normal range of motion  She exhibits no edema  Neurological: She is alert and oriented to person, place, and time  No cranial nerve deficit  Skin: Skin is warm and dry  Psychiatric:   Blunted affect         Additional Data:     Lab Results: I have personally reviewed pertinent reports          Results from last 7 days  Lab Units 05/07/18  0014   WBC Thousand/uL 4 13*   HEMOGLOBIN g/dL 13 4   HEMATOCRIT % 41 4   PLATELETS Thousands/uL 112*   NEUTROS PCT % 46   LYMPHS PCT % 46*   MONOS PCT % 6   EOS PCT % 2       Results from last 7 days  Lab Units 05/04/18  1022   SODIUM mmol/L 137   POTASSIUM mmol/L 4 3   CHLORIDE mmol/L 104   CO2 mmol/L 30   BUN mg/dL 20   CREATININE mg/dL 1 03   CALCIUM mg/dL 8 5   TOTAL PROTEIN g/dL 7 3   BILIRUBIN TOTAL mg/dL 0 18*   ALK PHOS U/L 89   ALT U/L 51   AST U/L 39   GLUCOSE RANDOM mg/dL 108             No results found for: HGBA1C        Imaging: I have personally reviewed pertinent reports  No orders to display       EKG, Pathology, and Other Studies Reviewed on Admission:   · EKG:     ** Please Note: This note has been constructed using a voice recognition system   **

## 2018-05-08 NOTE — ASSESSMENT & PLAN NOTE
- Reports soft, regular bowel movements with recent initiation of Linzess, some abdominal cramping at times  -Continue Linzess   -Colace prn for constipation, MoM and Imodium for diarrhea   -Bentyl prn for abdominal cramping

## 2018-05-08 NOTE — PROGRESS NOTES
Pt in bed reading  Pleasant on approach  Denies SI and states less depressed/anxious  Denies w/d symptoms but did c/o generalized pain and took motrin  States she is focused on getting/staying clean and putting her addiction behind her  Compliant with meds and care  Will continue to monitor

## 2018-05-08 NOTE — PROGRESS NOTES
Patient seen and examined for full consultation earlier this morning  Recommend rechecking thyroid function studies in 4-6 weeks outpatient  If patient unable to bring in [facility] [address] from home suggest use of other medications that treat constipation  Will sign off  Please call with questions

## 2018-05-09 NOTE — PROGRESS NOTES
Pt visible on unit  Pt pleasant on approach  Pt denies SI and states she feels better each day  Asked to get some papers from locker and to check messages on phone and assisted by MHT  Compliant with meds and care  Will monitor

## 2018-05-09 NOTE — CASE MANAGEMENT
SHERRELL wrote letter to Marie Gal[place] in [address] [facility], [address], confirming pt's admission  SHERRELL faxed letter  SHERRELL called 's sec'y, Ritesh, [phone], who said she rec'd letter  SHERRELL was told that  wrote script for Retrophin, but GLOG said that pt did dharmesh have insurance, which is not true  Pt has [place]  Pt told SHERRELL that the med was cov'd, as pt had gotten it prior to admission  [facility]  Spoke to Vinita Dubose, who said the script went thru & there was a $3 00 co-pay  SHERRELL left message for Mira Hernandez, pt's nurse, to find out if pt has the co-pay amt  SHERRELL will advise Fatimah Li

## 2018-05-09 NOTE — PROGRESS NOTES
Progress Note - [address] 50 y o  female MRN: [id number]   Unit/Bed#: NW7 762-01 Encounter: [id number]    Behavior over the last 24 hours: donna Gates continues to feel anxious, depressed and hopeless  She  still has blunted affect and poor eye contact  She is focusing more on opioid withdrawal and reports multiple symptoms today  Seclusive to the room  Limited participation in milieu  Compliant with medications  Sleep: slept off and on  Appetite: improving  Medication side effects: No   ROS: reports back pain, muscle aches, neck pain and sweating, denies any shortness of breath or chest pain    Mental Status Evaluation:    Appearance:  casually dressed   Behavior:  cooperative, minimal eye contact   Speech:  soft   Mood:  depressed, anxious   Affect:  blunted   Thought Process:  organized, concrete   Associations: concrete associations   Thought Content:  no overt delusions   Perceptual Disturbances: no auditory hallucinations, no visual hallucinations   Risk Potential: Suicidal ideation - None at present, but would not feel safe if discharged  Homicidal ideation - None  Potential for aggression - No   Sensorium:  oriented to person, place and time/date   Memory:  recent and remote memory grossly intact   Consciousness:  alert and awake   Attention: decreased concentration and decreased attention span   Insight:  impaired   Judgment: impaired   Gait/Station: normal gait/station and normal balance   Motor Activity: no abnormal movements     Vital signs in last 24 hours:    Temp:  [98 °F (36 7 °C)-98 6 °F (37 °C)] 98 6 °F (37 °C)  HR:  [72-82] 72  Resp:  [16] 16  BP: ()/(62-76) 123/76    Laboratory results:  I have personally reviewed all pertinent laboratory/tests results      Progress Toward Goals: no significant progress, still anxious, continues to feel depressed    Assessment/Plan   Principal Problem:    Bipolar I disorder, most recent episode mixed, severe without psychotic features ([facility] )  Active Problems:    Opioid type dependence, continuous ([facility] )    Chronic hepatitis C without hepatic coma (HCC)    Thrombocytopenia (HCC)    Borderline abnormal thyroid function test    Irritable bowel syndrome (IBS)    Recommended Treatment:     Planned medication and treatment changes: All current active medications have been reviewed    Encourage group therapy, milieu therapy and occupational therapy  [address] checks every 5 minutes  Increase Lamictal to 50 mg daily from tomorrow  Decrease Methadone to 30 mg daily and continue with taper  Taper off Ativan  Will also need Clonidine taper once off Methadone    Continue all other medications:    Current Facility-Administered Medications:  acetaminophen 650 mg Oral Q6H PRN Eric Calabrese MD   acetaminophen 975 mg Oral Q6H PRN Eric Calabrese MD   aluminum-magnesium hydroxide-simethicone 30 mL Oral Q4H PRN Anabell Quezada MD   amLODIPine 5 mg Oral Daily Eric Calabrese MD   benztropine 1 mg Intramuscular Q6H PRN Eric Calabrese MD   benztropine 1 mg Oral Q6H PRN Eric Calabrese MD   cloNIDine 0 1 mg Oral Q6H PRN Eric Calabrese MD   cloNIDine 0 1 mg Oral TID Eric Calabrese MD   [START ON 5/12/2018] cloNIDine 0 1 mg Oral BID Eric Calabrese MD   cyclobenzaprine 10 mg Oral TID PRN Anabell Quezada MD   dicyclomine 10 mg Oral Q6H PRN Eric Calabrese MD   docusate sodium 100 mg Oral HS PRN Eric Calabrese MD   fluticasone 1 spray Nasal Daily Eric Calabrese MD   gabapentin 300 mg Oral TID Eric Calabrese MD   haloperidol 5 mg Oral Q6H PRN Eric Calabrese MD   haloperidol lactate 5 mg Intramuscular Q6H PRN Eric Calabrese MD   hydrOXYzine HCL 25 mg Oral Q4H PRN Eric Calabrese MD   ibuprofen 600 mg Oral Q6H PRN Eric Calabrese MD   [START ON 5/10/2018] lamoTRIgine 50 mg Oral Daily Eric Calabrese MD   Linaclotide 290 mcg Oral Daily Eric Calabrese MD   loperamide 2 mg Oral Q4H PRN David Razo MD   LORazepam 1 mg Intramuscular Q6H PRN David Razo MD   LORazepam 0 5 mg Oral BID David Razo MD   magnesium hydroxide 30 mL Oral Daily PRN Ton Tay MD   [START ON 5/11/2018] methadone 10 mg Oral Daily David Razo MD   [START ON 5/10/2018] methadone 20 mg Oral Daily David Razo MD   [START ON 5/12/2018] methadone 5 mg Oral Once David Razo MD   nicotine 21 mg Transdermal Daily David Razo MD   nicotine polacrilex 4 mg Oral Q2H PRN Ton Tay MD   OLANZapine 10 mg Intramuscular Q3H PRN David Razo MD   QUEtiapine 400 mg Oral HS David Razo MD   risperiDONE 1 mg Oral Q4H PRN David Razo MD   traZODone 100 mg Oral HS David Razo MD       Risks / Benefits of Treatment:    Risks, benefits, and possible side effects of medications explained to patient and patient verbalizes understanding and agreement for treatment  Risks of medications in pregnancy explained if female patient  Patient verbalizes understanding and agrees to notify her doctor if she becomes pregnant  Counseling / Coordination of Care:    Patient's progress discussed with staff in treatment team meeting  Medications, treatment progress and treatment plan reviewed with patient  Medication changes discussed with patient

## 2018-05-10 NOTE — PROGRESS NOTES
Progress Note - [address] 50 y o  female MRN: [id number]   Unit/Bed#: NW7 762-01 Encounter: [id number]    Behavior over the last 24 hours: some improvement  Kassie Dahl states she feels slightly less depressed, has been staying more in milieu and socializes more with peer "it helps me forget about going through withdrawal"  Glad she was able to get a letter sent to postpone her hearing in [place]  She still feels anxious otherwise and is worrying about the future  Has been taking medications  Sleep: slept off and on, nightmares  Appetite: improving  Medication side effects: No   ROS: reports muscle aches and restlessness, denies any shortness of breath or chest pain    Mental Status Evaluation:    Appearance:  casually dressed   Behavior:  cooperative, improved eye contact   Speech:  soft   Mood:  anxious, slightly less depressed   Affect:  constricted   Thought Process:  organized, concrete   Associations: concrete associations   Thought Content:  no overt delusions   Perceptual Disturbances: no auditory hallucinations, no visual hallucinations   Risk Potential: Suicidal ideation - None at present, but would not feel safe if discharged  Homicidal ideation - None  Potential for aggression - No   Sensorium:  oriented to person, place and time/date   Memory:  recent and remote memory grossly intact   Consciousness:  alert and awake   Attention: decreased concentration and decreased attention span   Insight:  improving and partial   Judgment: improving and partial   Gait/Station: normal gait/station and normal balance   Motor Activity: no abnormal movements     Vital signs in last 24 hours:    Temp:  [97 5 °F (36 4 °C)-98 °F (36 7 °C)] 97 5 °F (36 4 °C)  HR:  [69-85] 69  Resp:  [16] 16  BP: (109-141)/(68-84) 141/84    Laboratory results:  I have personally reviewed all pertinent laboratory/tests results      Progress Toward Goals: progressing, still anxious, less depressed, working on coping skills, still has withdrawal symptoms    Assessment/Plan   Principal Problem:    Bipolar I disorder, most recent episode mixed, severe without psychotic features ([facility] [place] [facility] )  Active Problems:    Opioid type dependence, continuous ([facility] [place] [facility] )    Chronic hepatitis C without hepatic coma (HCC)    Thrombocytopenia (HCC)    Borderline abnormal thyroid function test    Irritable bowel syndrome (IBS)    Recommended Treatment:     Planned medication and treatment changes: All current active medications have been reviewed    Encourage group therapy, milieu therapy and occupational therapy  [address] checks every 5 minutes  Decrease Methadone to 20 mg daily and continue with taper  Add Atarax 25 mg tid to help with anxiety  Taper off Clonidine over the weekend once Methadone is discontinued  Decrease Ativan to 0 5 mg daily tomorrow and taper off    Continue all other medications:    Current Facility-Administered Medications:  acetaminophen 650 mg Oral Q6H PRN Moon Butler MD   acetaminophen 975 mg Oral Q6H PRN Moon Butler MD   aluminum-magnesium hydroxide-simethicone 30 mL Oral Q4H PRN Jeremy Carrillo MD   amLODIPine 5 mg Oral Daily Moon Butler MD   benztropine 1 mg Intramuscular Q6H PRN Moon Butler MD   benztropine 1 mg Oral Q6H PRN Moon Butler MD   cloNIDine 0 1 mg Oral Q6H PRN Moon Butler MD   cloNIDine 0 1 mg Oral TID Moon Butler MD   [START ON 5/12/2018] cloNIDine 0 1 mg Oral BID Moon Butler MD   cyclobenzaprine 10 mg Oral TID PRN Jeremy Carrillo MD   dicyclomine 10 mg Oral Q6H PRN Moon Butler MD   docusate sodium 100 mg Oral HS PRN Moon Butler MD   fluticasone 1 spray Nasal Daily Moon Butler MD   gabapentin 300 mg Oral TID Moon Butler MD   haloperidol 5 mg Oral Q6H PRN Moon Butler MD   haloperidol lactate 5 mg Intramuscular Q6H PRN Moon Butler MD   hydrOXYzine HCL 25 mg Oral Q4H PRN Moon Butler MD   hydrOXYzine HCL 25 mg Oral TID Marcelino Woodruff MD   ibuprofen 600 mg Oral Q6H PRN Marcelino Woodruff MD   lamoTRIgine 50 mg Oral Daily Marcelino Woodruff MD   Linaclotide 290 mcg Oral Daily Marcelino Woodruff MD   loperamide 2 mg Oral Q4H PRN Marcelino Woodruff MD   LORazepam 1 mg Intramuscular Q6H PRN Marcelino Woodruff MD   LORazepam 0 5 mg Oral BID Marcelino Woodruff MD   magnesium hydroxide 30 mL Oral Daily PRN Carlos Parra MD   [START ON 5/11/2018] methadone 10 mg Oral Daily Marcelino Woodruff MD   [START ON 5/12/2018] methadone 5 mg Oral Once Marcelino Woodruff MD   nicotine 21 mg Transdermal Daily Marcelino Woodruff MD   nicotine polacrilex 4 mg Oral Q2H PRN Carlos Parra MD   OLANZapine 10 mg Intramuscular Q3H PRN Marcelino Woodruff MD   QUEtiapine 400 mg Oral HS Marcelino Woodruff MD   risperiDONE 1 mg Oral Q4H PRN Marcelino Woodruff MD   traZODone 100 mg Oral HS Marcelino Woodruff MD       Risks / Benefits of Treatment:    Risks, benefits, and possible side effects of medications explained to patient and patient verbalizes understanding and agreement for treatment  Risks of medications in pregnancy explained if female patient  Patient verbalizes understanding and agrees to notify her doctor if she becomes pregnant  Counseling / Coordination of Care:    Patient's progress discussed with staff in treatment team meeting  Medications, treatment progress and treatment plan reviewed with patient  Medication changes discussed with patient
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
## 2018-05-10 NOTE — PROGRESS NOTES
Pt pleasant and social on unit  Brightens on approach  Denies symptoms  c/o neck pain and took Motrin with good effect but continued to c/o spasm in neck and given additional muscle relaxer PRN  Pt compliant with meds and care  Will continue to monitor

## 2018-05-10 NOTE — PROGRESS NOTES
Patient more visible in the milieu and brighter on approach  States that she is feeling good and is looking forward to being completely off of the Methadone  Denies SI, no complaints of anxiety  Compliant with medications  Will monitor

## 2018-05-11 NOTE — PROGRESS NOTES
Progress Note - Behavioral Health     Clovis Ball 50 y o  female MRN: @MRN   Unit/Bed#: FE7 463-34 Encounter: [id number]    Patient is brighter    Clovis Ball reports today that she is looking forward to dicharge, but does have  issues out there "and I kelly to take it easy, makes sure I don't get overwhelmed by things"  She feels more relaxed as she is coming off of methadone  Sleep not too good because of thinking, worrying, nightmares related to discharge  Meds going well, She notes she feels calm  Anxiety ranges 1-8/10; 3/10 depression  No SI  No HI  Energy a little low today due to sleep last night  Sleep: broken sleep  Appetite: normal  Medication side effects: No   ROS: no complaints    Mental Status Evaluation:    Appearance:  age appropriate   Behavior:  pleasant, cooperative, with good eye contact   Speech:  Normal volume, regular rate and rhythm   Mood:  dysphoric, anxious   Affect:  mood congruent       Thought Process:  Linear and goal directed   Associations: intact associations   Thought Content:  normal and appropriate   Perceptual Disturbances: no auditory or visual hallcunations   Risk Potential: Suicidal ideation - None  Homicidal ideation - None  Potential for aggression - No   Sensorium:  A&Ox3   Cognition:  grossly intact   Consciousness:  Alert & Awake   Memory:  recent and remote memory grossly intact   Attention: attention span and concentration are age appropriate   Intellect: within normal limits       Insight:  good   Judgment: fair         Gait/Station: normal gait/station with good balance   Motor Activity: no abnormal movements     Vital signs in last 24 hours:    Temp:  [98 °F (36 7 °C)] 98 °F (36 7 °C)  HR:  [68-92] 68  Resp:  [16] 16  BP: (101-128)/(68-79) 128/79    Laboratory results:  I have personally reviewed all pertinent laboratory/tests results      Progress Toward Goals:  slow improvement  Assessment/Plan   Principal Problem:    Bipolar I disorder, most recent episode mixed, severe without psychotic features ([facility] [address] )  Active Problems:    Chronic hepatitis C without hepatic coma (HCC)    Opioid type dependence, continuous (HCC)    Thrombocytopenia ([facility] [address] )    Borderline abnormal thyroid function test    Irritable bowel syndrome (IBS)      Radha Pack is showing some improvement, tolerating med reductions well  Would like increase in trazodone, EKG reviewed  Recommended Treatment:     Planned medication and treatment changes: All current active medications have been reviewed    Encourage group therapy, milieu therapy and occupational therapy  Behavioral Health checks every 5 minutes      Current Facility-Administered Medications:  acetaminophen 650 mg Oral Q6H PRN Lexa Dewitt MD   acetaminophen 975 mg Oral Q6H PRN Lexa Dewitt MD   aluminum-magnesium hydroxide-simethicone 30 mL Oral Q4H PRN Bradley Woodward MD   amLODIPine 5 mg Oral Daily Lexa Dewitt MD   benztropine 1 mg Intramuscular Q6H PRN Lexa Dewitt MD   benztropine 1 mg Oral Q6H PRN Lexa Dewitt MD   cloNIDine 0 1 mg Oral Q6H PRN Lexa Dewitt MD   cloNIDine 0 1 mg Oral TID Lexa Dewitt MD   [START ON 5/12/2018] cloNIDine 0 1 mg Oral BID Lexa Dewitt MD   cyclobenzaprine 10 mg Oral TID PRN Bradley Woodward MD   dicyclomine 10 mg Oral Q6H PRN Lexa Dewitt MD   docusate sodium 100 mg Oral HS PRN Lexa Dewitt MD   fluticasone 1 spray Nasal Daily Lexa eDwitt MD   gabapentin 300 mg Oral TID Lexa Dewitt MD   haloperidol 5 mg Oral Q6H PRN Lexa Dewitt MD   haloperidol lactate 5 mg Intramuscular Q6H PRN Lexa Dewitt MD   hydrOXYzine HCL 25 mg Oral Q4H PRN Lexa Dewitt MD   hydrOXYzine HCL 25 mg Oral TID Lexa Dewitt MD   ibuprofen 600 mg Oral Q6H PRN Lexa Dewitt MD   lamoTRIgine 50 mg Oral Daily Lexa Dewitt MD   Linaclotide 290 mcg Oral Daily Lexa Dewitt MD   loperamide 2 mg Oral Q4H PRN Jalen Garcia Austyn Santiago MD   LORazepam 1 mg Intramuscular Q6H PRN Jessi Mares MD   magnesium hydroxide 30 mL Oral Daily PRN Pipe Macias MD   [START ON 5/12/2018] methadone 5 mg Oral Once Jessi Mares MD   nicotine 21 mg Transdermal Daily Jessi Mares MD   nicotine polacrilex 4 mg Oral Q2H PRN Pipe Macias MD   OLANZapine 10 mg Intramuscular Q3H PRN Jessi Mraes MD   QUEtiapine 400 mg Oral HS Jessi Mares MD   risperiDONE 1 mg Oral Q4H PRN Jessi Mares MD   traZODone 100 mg Oral HS Jessi Mares MD       1) Tapering of methadone (5mg Sat final dose)  2) Tapering of clonidine (0 1mg BID Saturday, 0 1mg Daily Sunday, stop)  3) Ativan has been discontinued  4) Planned discharge Monday  5) Atarax changed to 50mg q6hr PRN  6) Trazodone increased to 150mg HS  7) Continue to support patient and engage them in the programs available as feasible and appropriate  Continue case management support and therapy  Continue discharge planning  Risks / Benefits of Treatment:    Risks, benefits, and possible side effects of medications explained to patient and patient verbalizes understanding and agreement for treatment  Counseling / Coordination of Care:    Patient's progress discussed with staff in treatment team meeting  Medications, treatment progress and treatment plan reviewed with patient        Paola Monte III, DO  5/11/2018  9:26 AM

## 2018-05-11 NOTE — PROGRESS NOTES
PT given PRn flexeril 10 mg and motrin 600 mg PO for 5/10 muscle/neck pain  PT states she is for D/C Monday and she is going back to work Tuesday  PT denies Si  PT is out in dayroom socializing w/ peers and coloring  Pt c/o constipation   Pt states " The medication that works is the one I brought from home "

## 2018-05-11 NOTE — PROGRESS NOTES
Patient about the unit and socializing with peers  Bright and pleasant  Denies all symptoms at this time  Compliant with medications  No complaints  Will monitor

## 2018-05-12 NOTE — PROGRESS NOTES
Pt pleasant and bright upon approach denies all s/s c/o B/L neck apin 8/10 PRN Motrin given was effective at 0937will monitor

## 2018-05-12 NOTE — PROGRESS NOTES
C/O" I am doing good "  Report from staff regarding this patient received and record reviewed  prior to seeing this patient   Behavior over the last 24 hours:    Sleep:good  Appetite:good  Medication side effects:denied  ROS:improving   Mental Status Evaluation:  Appearance:  Dressed appropraitely   Behavior:  cooperative   Speech:  normal   Mood:  euthymic   Affect:  appropriate     Thought Process:  Goal directed   Thought Content:  normal   Perceptual Disturbances: Denied AV hallucination   Risk Potential: NO NADIA    Sensorium:  normal   Cognition:  intact   Consciousness:  Alert, OX3   Attention: Fair   Insight:  fair   Judgment: fair   Gait/Station: With in normal range   Motor Activity: With in normal range     Progress Toward Goals: working on current treatment goals, no changes  Made in treatment plan   Recommended Treatment: Continue with group therapy, milieu therapy and occupational therapy  Risks, benefits and possible side effects of Medications:   Risks, benefits, and possible side effects of medications explained to patient and patient verbalizes understanding        Medications:   current meds:   Current Facility-Administered Medications   Medication Dose Route Frequency    acetaminophen (TYLENOL) tablet 650 mg  650 mg Oral Q6H PRN    acetaminophen (TYLENOL) tablet 975 mg  975 mg Oral Q6H PRN    aluminum-magnesium hydroxide-simethicone (MYLANTA) 200-200-20 mg/5 mL oral suspension 30 mL  30 mL Oral Q4H PRN    amLODIPine (NORVASC) tablet 5 mg  5 mg Oral Daily    benztropine (COGENTIN) injection 1 mg  1 mg Intramuscular Q6H PRN    benztropine (COGENTIN) tablet 1 mg  1 mg Oral Q6H PRN    cloNIDine (CATAPRES) tablet 0 1 mg  0 1 mg Oral Q6H PRN    cloNIDine (CATAPRES) tablet 0 1 mg  0 1 mg Oral BID    cyclobenzaprine (FLEXERIL) tablet 10 mg  10 mg Oral TID PRN    dicyclomine (BENTYL) capsule 10 mg  10 mg Oral Q6H PRN    docusate sodium (COLACE) capsule 100 mg  100 mg Oral HS PRN    fluticasone (FLONASE) 50 mcg/act nasal spray 1 spray  1 spray Nasal Daily    gabapentin (NEURONTIN) capsule 300 mg  300 mg Oral TID    haloperidol (HALDOL) tablet 5 mg  5 mg Oral Q6H PRN    haloperidol lactate (HALDOL) injection 5 mg  5 mg Intramuscular Q6H PRN    hydrOXYzine HCL (ATARAX) tablet 50 mg  50 mg Oral Q6H PRN    ibuprofen (MOTRIN) tablet 600 mg  600 mg Oral Q6H PRN    lamoTRIgine (LaMICtal) tablet 50 mg  50 mg Oral Daily    Linaclotide CAPS 1 capsule  290 mcg Oral Daily    loperamide (IMODIUM) capsule 2 mg  2 mg Oral Q4H PRN    LORazepam (ATIVAN) 2 mg/mL injection 1 mg  1 mg Intramuscular Q6H PRN    magnesium hydroxide (MILK OF MAGNESIA) 400 mg/5 mL oral suspension 30 mL  30 mL Oral Daily PRN    nicotine (NICODERM CQ) 21 mg/24 hr TD 24 hr patch 21 mg  21 mg Transdermal Daily    nicotine polacrilex (NICORETTE) gum 4 mg  4 mg Oral Q2H PRN    OLANZapine (ZyPREXA) IM injection 10 mg  10 mg Intramuscular Q3H PRN    QUEtiapine (SEROquel) tablet 400 mg  400 mg Oral HS    risperiDONE (RisperDAL M-TABS) dispersible tablet 1 mg  1 mg Oral Q4H PRN    traZODone (DESYREL) tablet 150 mg  150 mg Oral HS     Labs: NA    Assessment, Diagnosis  and Plan: continue with current meds and goals, F/U tomorrow    Counseling / Coordination of Care  Total floor / unit time spent today20 minutes  minutes  Greater than 50% of total time was spent with the patient and / or family counseling and / or coordination of care  A description of the counseling / coordination of care:      Aparna Vásquez MD

## 2018-05-12 NOTE — PROGRESS NOTES
Pt about unit and social with peers  Pleasant and bright on approach  Denies all symptoms  Compliant with meds and care  Will monitor

## 2018-05-13 NOTE — PROGRESS NOTES
C/O" I feel balancem, stable, feel better "    Report from staff regarding this patient received and record reviewed  prior to seeing this patient   Behavior over the last 24 hours:    Sleep:Good  Appetite:ok  Medication side effects:denied  ROS:  Mental Status Evaluation:  Appearance:  Dressed appropraitely   Behavior:  cooperative   Speech:  normal   Mood:  euthymic   Affect:  appropriate     Thought Process:  Goal directed   Thought Content:  normal   Perceptual Disturbances: Denied AV hallucination   Risk Potential: NO NADIA    Sensorium:  normal   Cognition:  intact   Consciousness:  Alert, OX3   Attention: Fair   Insight:  fair   Judgment: fair   Gait/Station: With in normal range   Motor Activity: With in normal range     Progress Toward Goals: working on current treatment goals, no changes  Made in treatment plan   Recommended Treatment: Continue with group therapy, milieu therapy and occupational therapy  Risks, benefits and possible side effects of Medications:   Risks, benefits, and possible side effects of medications explained to patient and patient verbalizes understanding        Medications:   current meds:   Current Facility-Administered Medications   Medication Dose Route Frequency    acetaminophen (TYLENOL) tablet 650 mg  650 mg Oral Q6H PRN    acetaminophen (TYLENOL) tablet 975 mg  975 mg Oral Q6H PRN    aluminum-magnesium hydroxide-simethicone (MYLANTA) 200-200-20 mg/5 mL oral suspension 30 mL  30 mL Oral Q4H PRN    amLODIPine (NORVASC) tablet 5 mg  5 mg Oral Daily    benztropine (COGENTIN) injection 1 mg  1 mg Intramuscular Q6H PRN    benztropine (COGENTIN) tablet 1 mg  1 mg Oral Q6H PRN    cloNIDine (CATAPRES) tablet 0 1 mg  0 1 mg Oral Q6H PRN    cloNIDine (CATAPRES) tablet 0 1 mg  0 1 mg Oral BID    cyclobenzaprine (FLEXERIL) tablet 10 mg  10 mg Oral TID PRN    dicyclomine (BENTYL) capsule 10 mg  10 mg Oral Q6H PRN    docusate sodium (COLACE) capsule 100 mg  100 mg Oral HS PRN    fluticasone (FLONASE) 50 mcg/act nasal spray 1 spray  1 spray Nasal Daily    gabapentin (NEURONTIN) capsule 300 mg  300 mg Oral TID    haloperidol (HALDOL) tablet 5 mg  5 mg Oral Q6H PRN    haloperidol lactate (HALDOL) injection 5 mg  5 mg Intramuscular Q6H PRN    hydrOXYzine HCL (ATARAX) tablet 50 mg  50 mg Oral Q6H PRN    ibuprofen (MOTRIN) tablet 600 mg  600 mg Oral Q6H PRN    lamoTRIgine (LaMICtal) tablet 50 mg  50 mg Oral Daily    Linaclotide CAPS 1 capsule  290 mcg Oral Daily    loperamide (IMODIUM) capsule 2 mg  2 mg Oral Q4H PRN    LORazepam (ATIVAN) 2 mg/mL injection 1 mg  1 mg Intramuscular Q6H PRN    magnesium hydroxide (MILK OF MAGNESIA) 400 mg/5 mL oral suspension 30 mL  30 mL Oral Daily PRN    nicotine (NICODERM CQ) 21 mg/24 hr TD 24 hr patch 21 mg  21 mg Transdermal Daily    nicotine polacrilex (NICORETTE) gum 4 mg  4 mg Oral Q2H PRN    OLANZapine (ZyPREXA) IM injection 10 mg  10 mg Intramuscular Q3H PRN    QUEtiapine (SEROquel) tablet 400 mg  400 mg Oral HS    risperiDONE (RisperDAL M-TABS) dispersible tablet 1 mg  1 mg Oral Q4H PRN    traZODone (DESYREL) tablet 150 mg  150 mg Oral HS     Labs: NA    Assessment, Diagnosis  and Plan: continue with current meds and goals, F/U tomorrow    Counseling / Coordination of Care  Total floor / unit time spent today20 minutes  minutes  Greater than 50% of total time was spent with the patient and / or family counseling and / or coordination of care  A description of the counseling / coordination of care:      Sheryl Yun MD

## 2018-05-13 NOTE — PLAN OF CARE
Problem: Depression  Goal: Treatment Goal: Demonstrate behavioral control of depressive symptoms, verbalize feelings of improved mood/affect, and adopt new coping skills prior to discharge  Outcome: Progressing    Goal: Verbalize thoughts and feelings  Interventions:  - Assess and re-assess patient's level of risk   - Engage patient in 1:1 interactions, daily, for a minimum of 15 minutes   - Encourage patient to express feelings, fears, frustrations, hopes    Outcome: Progressing    Goal: Refrain from harming self  Interventions:  - Monitor patient closely, per order   - Supervise medication ingestion, monitor effects and side effects    Outcome: Progressing    Goal: Refrain from isolation  Interventions:  - Develop a trusting relationship   - Encourage socialization    Outcome: Progressing    Goal: Refrain from self-neglect  Outcome: Progressing    Goal: Complete daily ADLs, including personal hygiene independently, as able  Interventions:  - Observe, teach, and assist patient with ADLS  -  Monitor and promote a balance of rest/activity, with adequate nutrition and elimination    Outcome: Progressing      Problem: DISCHARGE PLANNING  Goal: Discharge to home or other facility with appropriate resources  INTERVENTIONS:  - Identify barriers to discharge w/patient and caregiver  - Arrange for needed discharge resources and transportation  - Refer to Case Management Department for coordinating discharge planning if the patient needs post-hospital services based on physician/advanced practitioner order or complex needs related to functional status, cognitive ability, or social support system  propriate  - Identify discharge learning needs (meds, wound care, etc )  -   Outcome: Progressing      Problem: Ineffective Coping  Goal: Participates in unit activities  Interventions:  - Provide therapeutic environment   - Provide required programming   - Redirect inappropriate behaviors    Outcome: Progressing      Problem: SUBSTANCE USE/ABUSE  Goal: Will have no detox symptoms and will verbalize plan for changing substance-related behavior  INTERVENTIONS:  - Monitor physical status and assess for symptoms of withdrawal  - Administer medication as ordered  - Provide emotional support with 1 on 1 interaction with staff  - Encourage recovery focused program/ addiction education  - Assess for verbalization of changing behaviors related to substance abuse  - Initiate consults and referrals as appropriate (Case Management, Spiritual Care, etc )     Goal extended 5/13/18   Outcome: Progressing    Goal: By discharge, will develop insight into their chemical dependency and sustain motivation to continue in recovery  INTERVENTIONS:  - Attends all daily group sessions and scheduled AA groups  - Actively practices coping skills through participation in the therapeutic community and adherence to program rules  - Reviews and completes assignments from individual treatment plan  - Assist patient development of understanding of their personal cycle of addiction and relapse triggers     Goal extended 5/13/18   Outcome: Progressing    Goal: By discharge, patient will have ongoing treatment plan addressing chemical dependency  INTERVENTIONS:  - Assist patient with resources and/or appointments for ongoing recovery based living     Goal extended 5/13/18   Outcome: Progressing

## 2018-05-13 NOTE — PROGRESS NOTES
Pt bright, pleasant and social on unit  Denies symptoms  C/o feeling tired in am after meds, needing to take a nap each morning and states she will need to be alert to work, and asked RN to review medications to see what might be causing sedation  Pt given handwritten med list and interested in possibly changing Lamictal dose from AM to HS  Pt will discuss with MD tomorrow  Will monitor

## 2018-05-14 PROBLEM — F41.1 GAD (GENERALIZED ANXIETY DISORDER): Chronic | Status: ACTIVE | Noted: 2018-01-01

## 2018-05-14 NOTE — PROGRESS NOTES
PT is pleasant and happy for D/C today  PT given work note for hospital stay and taxi voucher/bus pass for transportation home  Pt does not have anyone to pick her up at this time   Pt will get bus at 1:45 pm

## 2018-05-14 NOTE — PROGRESS NOTES
Patient visible in milieu at this time  Social with peers  Pleasant and bright on approach  Compliant with medications and is hopeful for discharge

## 2018-05-14 NOTE — DISCHARGE INSTR - LAB
Contact Information: If you have any questions, concerns, pended studies, tests and/or procedures, or emergencies regarding your inpatient behavioral health visit  Please contact [facility] [address] behavioral health unit [phone] and ask to speak to a , nurse or physician  A contact is available 24 hours/ 7 days a week at this number  Summary of Procedures Performed During your Stay:  Below is a list of major procedures performed during your hospital stay and a summary of results:  - No major procedures performed  Pending Studies     None        If studies are pending at discharge, follow up with your PCP and/or referring provider

## 2018-05-14 NOTE — PLAN OF CARE
Problem: Depression  Goal: Treatment Goal: Demonstrate behavioral control of depressive symptoms, verbalize feelings of improved mood/affect, and adopt new coping skills prior to discharge  Outcome: Completed Date Met: 05/14/18    Goal: Verbalize thoughts and feelings  Interventions:  - Assess and re-assess patient's level of risk   - Engage patient in 1:1 interactions, daily, for a minimum of 15 minutes   - Encourage patient to express feelings, fears, frustrations, hopes    Outcome: Completed Date Met: 05/14/18    Goal: Refrain from harming self  Interventions:  - Monitor patient closely, per order   - Supervise medication ingestion, monitor effects and side effects    Outcome: Completed Date Met: 05/14/18    Goal: Refrain from isolation  Interventions:  - Develop a trusting relationship   - Encourage socialization    Outcome: Completed Date Met: 05/14/18    Goal: Refrain from self-neglect  Outcome: Completed Date Met: 05/14/18    Goal: Complete daily ADLs, including personal hygiene independently, as able  Interventions:  - Observe, teach, and assist patient with ADLS  -  Monitor and promote a balance of rest/activity, with adequate nutrition and elimination    Outcome: Completed Date Met: 05/14/18      Problem: DISCHARGE PLANNING  Goal: Discharge to home or other facility with appropriate resources  INTERVENTIONS:  - Identify barriers to discharge w/patient and caregiver  - Arrange for needed discharge resources and transportation  - Refer to Case Management Department for coordinating discharge planning if the patient needs post-hospital services based on physician/advanced practitioner order or complex needs related to functional status, cognitive ability, or social support system  propriate  - Identify discharge learning needs (meds, wound care, etc )  -   Outcome: Completed Date Met: 05/14/18      Problem: Ineffective Coping  Goal: Participates in unit activities  Interventions:  - Provide therapeutic environment   - Provide required programming   - Redirect inappropriate behaviors    Outcome: Completed Date Met: 05/14/18      Problem: SUBSTANCE USE/ABUSE  Goal: Will have no detox symptoms and will verbalize plan for changing substance-related behavior  INTERVENTIONS:  - Monitor physical status and assess for symptoms of withdrawal  - Administer medication as ordered  - Provide emotional support with 1 on 1 interaction with staff  - Encourage recovery focused program/ addiction education  - Assess for verbalization of changing behaviors related to substance abuse  - Initiate consults and referrals as appropriate (Case Management, Spiritual Care, etc )     Goal extended 5/13/18   Outcome: Completed Date Met: 05/14/18    Goal: By discharge, will develop insight into their chemical dependency and sustain motivation to continue in recovery  INTERVENTIONS:  - Attends all daily group sessions and scheduled AA groups  - Actively practices coping skills through participation in the therapeutic community and adherence to program rules  - Reviews and completes assignments from individual treatment plan  - Assist patient development of understanding of their personal cycle of addiction and relapse triggers     Goal extended 5/13/18   Outcome: Completed Date Met: 05/14/18    Goal: By discharge, patient will have ongoing treatment plan addressing chemical dependency  INTERVENTIONS:  - Assist patient with resources and/or appointments for ongoing recovery based living     Goal extended 5/13/18   Outcome: Completed Date Met: 05/14/18

## 2018-11-25 PROBLEM — G92.8 TOXIC METABOLIC ENCEPHALOPATHY: Status: ACTIVE | Noted: 2018-01-01

## 2018-11-25 PROBLEM — J96.01 ACUTE HYPOXEMIC RESPIRATORY FAILURE (HCC): Status: ACTIVE | Noted: 2018-01-01

## 2018-11-25 PROBLEM — E87.2 METABOLIC ACIDOSIS: Status: ACTIVE | Noted: 2018-01-01

## 2018-11-25 PROBLEM — T50.901A DRUG OVERDOSE: Status: ACTIVE | Noted: 2018-01-01

## 2018-11-25 PROBLEM — N17.9 ACUTE KIDNEY INJURY (HCC): Status: ACTIVE | Noted: 2018-01-01

## 2018-11-25 PROBLEM — I46.9 CARDIAC ARREST (HCC): Status: ACTIVE | Noted: 2018-01-01

## 2018-11-25 NOTE — PLAN OF CARE
CARDIOVASCULAR - ADULT     Maintains optimal cardiac output and hemodynamic stability Not Progressing     Absence of cardiac dysrhythmias or at baseline rhythm Not Progressing        GENITOURINARY - ADULT     Maintains or returns to baseline urinary function Not Progressing     Absence of urinary retention Not Progressing     Urinary catheter remains patent Not Progressing        HEMATOLOGIC - ADULT     Maintains hematologic stability Not Progressing        METABOLIC, FLUID AND ELECTROLYTES - ADULT     Electrolytes maintained within normal limits Not Progressing     Fluid balance maintained Not Progressing     Glucose maintained within target range Not Progressing        NEUROSENSORY - ADULT     Achieves stable or improved neurological status Not Progressing     Absence of seizures Not Progressing     Remains free of injury related to seizures activity Not Progressing     Achieves maximal functionality and self care Not Progressing        Prexisting or High Potential for Compromised Skin Integrity     Skin integrity is maintained or improved Not Progressing        RESPIRATORY - ADULT     Achieves optimal ventilation and oxygenation Not Progressing

## 2018-11-25 NOTE — ED PROVIDER NOTES
History  Chief Complaint   Patient presents with    Cardiac Arrest     pt comes in by EMS  on ambu bag compressions ongoing , poss 10 mins downtime after Heroine OD     78-year-old female presents in cardiac arrest   She was found in cardiac arrest and EMS states that she was asystolic upon their arrival with persistent ACLS and asystole during transit  The patient was given 4 mg of pre-hospital Narcan several rounds of epinephrine  There is no clinical improvement intubation, ACLS        History limited by:  Patient unresponsive      None       Past Medical History:   Diagnosis Date    Benign essential HTN     B[facility] I disorder ([facility] )     Continuous opioid dependence ([facility] )     Dysmenorrhea     ETOH abuse     LISA (generalized anxiety disorder)     Hallux valgus     Hepatitis C infection     IBS (irritable bowel syndrome)     Rosacea        Past Surgical History:   Procedure Laterality Date     SECTION      HAND SURGERY         Family History   Problem Relation Age of Onset    Family history unknown: Yes     I have reviewed and agree with the history as documented  Social History   Substance Use Topics    Smoking status: Current Every Day Smoker     Packs/day: 1 00     Years: 15 00     Types: Cigarettes    Smokeless tobacco: Never Used    Alcohol use Yes        Review of Systems   Unable to perform ROS: Patient unresponsive       Physical Exam  Physical Exam   Constitutional: She appears well-developed and well-nourished  She appears distressed  HENT:   Head: Normocephalic and atraumatic  Right Ear: External ear normal    Left Ear: External ear normal    Nose: Nose normal    Eyes: Conjunctivae are normal    Pupils fixed and dilated   Neck: No JVD present  No tracheal deviation present  Cardiovascular:   Pulses:       Carotid pulses are 0 on the right side, and 0 on the left side  Femoral pulses are 0 on the right side, and 0 on the left side    Absent heart sounds Pulmonary/Chest:   Mechanical respiratory sounds from intubation of bag-valve mask  Right greater than left   Abdominal: Soft  She exhibits no distension  Musculoskeletal: She exhibits no edema or deformity  Lymphadenopathy:        Right: No inguinal adenopathy present  Left: No inguinal adenopathy present  Neurological: GCS eye subscore is 1  GCS verbal subscore is 1  GCS motor subscore is 1  Skin: Skin is dry  Nursing note and vitals reviewed        Vital Signs  ED Triage Vitals   Temperature Pulse Respirations Blood Pressure SpO2   11/25/18 1009 11/25/18 0914 11/25/18 0914 11/25/18 0915 11/25/18 1037   (!) 94 8 °F (34 9 °C) 86 (!) 137 (!) 83/50 100 %      Temp src Heart Rate Source Patient Position - Orthostatic VS BP Location FiO2 (%)   -- -- 11/25/18 1006 -- --     Lying        Pain Score       --                  Vitals:    11/26/18 1501 11/26/18 1615 11/26/18 1941 11/26/18 1945   BP: 143/70 138/72  146/92   Pulse: 104 102 104    Patient Position - Orthostatic VS:           Visual Acuity  Visual Acuity      Most Recent Value   L Pupil Size (mm)  3   R Pupil Size (mm)  3   L Pupil Shape  Round   R Pupil Shape  Round          ED Medications  Medications   sodium chloride 0 9 % infusion (0 mL/hr Intravenous Stopped 11/26/18 2235)   morphine 250 mg in sodium chloride 0 9% 50mL drip (0 mg/hr Intravenous Stopped 11/26/18 2235)   scopolamine (TRANSDERM-SCOP) 1 5 mg/3 days TD 72 hr patch 1 patch (1 patch Transdermal Medication Applied 11/26/18 2117)   atropine (ISOPTO ATROPINE) 1 % ophthalmic solution 1 drop (1 drop Both Eyes Not Given 11/26/18 2235)   EPINEPHrine (ADRENALIN) injection (1 mg Intravenous Given 11/25/18 0911)   naloxone (NARCAN) 2 mg/2 mL injection (2 mg Intraosseous Given 11/25/18 0916)   sodium bicarbonate 50 mEq/50 mL injection (50 mEq Intraosseous Given 11/25/18 0921)   labetalol (NORMODYNE) injection 10 mg (10 mg Intravenous Given 11/25/18 1251)   potassium chloride 10 % oral solution 40 mEq (40 mEq Oral Given 11/26/18 0525)   potassium chloride 40 mEq IVPB (premix) (0 mEq Intravenous Stopped 11/26/18 0725)   morphine injection 2 mg (2 mg Intravenous Given 11/26/18 2112)       Diagnostic Studies  Results Reviewed     Procedure Component Value Units Date/Time    T4, free [[id number]]  (Abnormal) Collected:  11/25/18 0933    Lab Status:  Final result Specimen:  Blood from Arm, Right Updated:  11/25/18 2211     Free T4 1 51 (H) ng/dL     Blood culture [[id number]] Collected:  11/25/18 1545    Lab Status: In process Specimen:  Blood from Central Venous Line Updated:  11/25/18 1551    Blood culture [[id number]] Collected:  11/25/18 1547    Lab Status: In process Specimen:  Blood from Central Venous Line Updated:  11/25/18 1551    TSH, 3rd generation [[id number]]  (Abnormal) Collected:  11/25/18 0933    Lab Status:  Final result Specimen:  Blood from Arm, Right Updated:  11/25/18 1538     TSH 3RD GENERATON 0 278 (L) uIU/mL     Narrative:         Patients undergoing fluorescein dye angiography may retain small amounts of fluorescein in the body for 48-72 hours post procedure  Samples containing fluorescein can produce falsely depressed TSH values  If the patient had this procedure,a specimen should be resubmitted post fluorescein clearance  The recommended reference ranges for TSH during pregnancy are as follows:  First trimester 0 1 to 2 5 uIU/mL  Second trimester  0 2 to 3 0 uIU/mL  Third trimester 0 3 to 3 0 uIU/m      Lactic acid, plasma [[id number]]  (Abnormal) Collected:  11/25/18 1059    Lab Status:  Final result Specimen:  Blood from Central Venous Line Updated:  11/25/18 1146     LACTIC ACID 6 7 (HH) mmol/L     Narrative:         Result may be elevated if tourniquet was used during collection      Rapid drug screen, urine [[id number]]  (Normal) Collected:  11/25/18 1027    Lab Status:  Final result Specimen:  Urine from Urine, Catheter Updated:  11/25/18 1052     Amph/Meth UR Negative     Barbiturate Ur Negative     Benzodiazepine Urine Negative     Cocaine Urine Negative     Methadone Urine Negative     Opiate Urine Negative     PCP Ur Negative     THC Urine Negative    Narrative:         FOR MEDICAL PURPOSES ONLY  IF CONFIRMATION NEEDED PLEASE CONTACT THE LAB WITHIN 5 DAYS      Drug Screen Cutoff Levels:  AMPHETAMINE/METHAMPHETAMINES  1000 ng/mL  BARBITURATES     200 ng/mL  BENZODIAZEPINES     200 ng/mL  COCAINE      300 ng/mL  METHADONE      300 ng/mL  OPIATES      300 ng/mL  PHENCYCLIDINE     25 ng/mL  THC       50 ng/mL    POCT urinalysis dipstick [[id number]]  (Abnormal) Resulted:  11/25/18 1027    Lab Status:  Final result Specimen:  Urine Updated:  11/25/18 1027    POCT pregnancy, urine [[id number]]  (Normal) Resulted:  11/25/18 1027    Lab Status:  Final result Updated:  11/25/18 1027     EXT PREG TEST UR (Ref: Negative) HCG = neg (-)    Ethanol [[id number]]  (Normal) Collected:  11/25/18 0931    Lab Status:  Final result Specimen:  Blood from Arm, Right Updated:  11/25/18 0959     Ethanol Lvl <3 mg/dL     Troponin I [[id number]]  (Normal) Collected:  11/25/18 0931    Lab Status:  Final result Specimen:  Blood from Arm, Right Updated:  11/25/18 0956     Troponin I <0 02 ng/mL     Comprehensive metabolic panel [[id number]]  (Abnormal) Collected:  11/25/18 0931    Lab Status:  Final result Specimen:  Blood from Arm, Right Updated:  11/25/18 0953     Sodium 144 mmol/L      Potassium 4 1 mmol/L      Chloride 104 mmol/L      CO2 22 mmol/L      ANION GAP 18 (H) mmol/L      BUN 12 mg/dL      Creatinine 1 48 (H) mg/dL      Glucose 271 (H) mg/dL      Calcium 8 7 mg/dL      AST 41 U/L      ALT 43 U/L      Alkaline Phosphatase 81 U/L      Total Protein 6 2 (L) g/dL      Albumin 3 0 (L) g/dL      Total Bilirubin 0 21 mg/dL      eGFR 20 ml/min/1 73sq m     Narrative:         National Kidney Disease Education Program recommendations are as follows:  GFR calculation is accurate only with a steady state creatinine  Chronic Kidney disease less than 60 ml/min/1 73 sq  meters  Kidney failure less than 15 ml/min/1 73 sq  meters      Salicylate level [[id number]]  (Normal) Collected:  11/25/18 0933    Lab Status:  Final result Specimen:  Blood from Arm, Right Updated:  84/86/20 7437     Salicylate Lvl 3 2 mg/dL     Acetaminophen level [[id number]]  (Abnormal) Collected:  11/25/18 0933    Lab Status:  Final result Specimen:  Blood from Arm, Right Updated:  11/25/18 0953     Acetaminophen Level <2 (L) ug/mL     Blood gas, arterial [[id number]]  (Abnormal) Collected:  11/25/18 0936    Lab Status:  Final result Specimen:  Blood, Arterial from Radial, Right Updated:  11/25/18 0947     pH, Arterial 7 167 (LL)     pCO2, Arterial 44 5 (H) mm Hg      pO2, Arterial 275 7 (H) mm Hg      HCO3, Arterial 15 8 (L) mmol/L      Base Excess, Arterial -12 3 mmol/L      O2 Content, Arterial 16 4 mL/dL      O2 HGB,Arterial  95 3 %      SOURCE Radial, Right     ANNAMARIA TEST Yes     Vent Type- AC AC     AC Rate 22     Tidal Volume 450 ml      Inspired Air (FIO2) 100     PEEP 5    POCT Chem 8+ [[id number]]  (Abnormal) Collected:  11/25/18 0920    Lab Status:  Final result Updated:  11/25/18 0944     SODIUM, I-STAT 139 mmol/l      Potassium, i-STAT 5 9 (H) mmol/L      Chloride, istat 110 (H) mmol/L      CO2, i-STAT 18 (L) mmol/L      Anion Gap, i-STAT 18 (H) mmol/L      Calcium, Ionized i-STAT 0 94 (L) mmol/L      BUN, I-STAT 12 mg/dl      Creatinine, i-STAT 0 9 mg/dl      eGFR 37 ml/min/1 73sq m      Glucose, i-STAT 167 (H) mg/dl      Hct, i-STAT 34 (L) %      Hgb, i-STAT 11 6 g/dl      Specimen Type VENOUS    Fingerstick Glucose (POCT) [[id number]]  (Abnormal) Collected:  11/25/18 0912    Lab Status:  Final result Updated:  11/25/18 0942     POC Glucose 162 (H) mg/dl     CBC and differential [[id number]]  (Abnormal) Collected:  11/25/18 0931    Lab Status:  Final result Specimen:  Blood from Arm, Right Updated:  11/25/18 0929     WBC 9 45 Thousand/uL      RBC 3 81 Million/uL      Hemoglobin 10 8 (L) g/dL      Hematocrit 37 2 %      MCV 98 fL      MCH 28 3 pg      MCHC 29 0 (L) g/dL      RDW 13 4 %      MPV 12 0 fL      Platelets 815 Thousands/uL      nRBC 0 /100 WBCs      Neutrophils Relative 42 (L) %      Immat GRANS % 0 %      Lymphocytes Relative 51 (H) %      Monocytes Relative 6 %      Eosinophils Relative 1 %      Basophils Relative 0 %      Neutrophils Absolute 3 92 Thousands/µL      Immature Grans Absolute 0 03 Thousand/uL      Lymphocytes Absolute 4 82 (H) Thousands/µL      Monocytes Absolute 0 53 Thousand/µL      Eosinophils Absolute 0 13 Thousand/µL      Basophils Absolute 0 02 Thousands/µL                  CT head wo contrast   Final Result by Joe Moore DO (11/26 6744)      Diffuse loss of gray-white differentiation and effacement of sulci suggesting diffuse anoxic injury and cerebral edema  I personally discussed this study with Isaac Mishra on 11/26/2018 at 3:53 PM                         Workstation performed: [id number]         XR chest portable   Final Result by Taryn Ruggiero MD (11/26 8668)      1  Endotracheal tube has been retracted measuring 4 5 cm from the jim in the mid thoracic trachea  Positioning appears adequate  2   Interval retraction of the enteric tube  Tip is in the distal esophagus  Consider advancing  3   Appropriately positioned left IJ catheter  4   No acute cardiopulmonary findings  The study was marked in EPIC for significant notification  Workstation performed: [id number]         XR chest 1 view portable   ED Interpretation by Richie Tsai DO (11/25 6905)   Endotracheal tube in satisfactory position, left subclavian triple-lumen in position, there is no pneumothorax  OG tube in satisfactory position  No other significant cardiopulmonary pathology noted      Final Result by Aroldo Russell MD (11/25 1634)      No acute cardiopulmonary disease  Workstation performed: [id number]         CT head without contrast   Final Result by Chuy Curran MD (11/25 1122)      No acute intracranial abnormality  Preservation of gray-white junction  No hemorrhage or edema                    Workstation performed: [id number]                    Procedures  ECG 12 Lead Documentation  Date/Time: 11/25/2018 10:09 AM  Performed by: Nasima Mcclain  Authorized by: Lulu FARNSWORTH     Indications / Diagnosis:  Cardiac arrest  ECG reviewed by me, the ED Provider: yes    Patient location:  ED  Previous ECG:     Previous ECG:  Unavailable  Interpretation:     Interpretation: normal    Rate:     ECG rate:  119    ECG rate assessment: tachycardic    Rhythm:     Rhythm: sinus rhythm and sinus tachycardia    Ectopy:     Ectopy: none    QRS:     QRS axis:  Normal    QRS intervals:  Normal  Conduction:     Conduction: normal    ST segments:     ST segments:  Normal  T waves:     T waves: normal    CriticalCare Time  Performed by: Nasima Mcclain  Authorized by: Lulu FARNSWORTH     Critical care provider statement:     Critical care time (minutes):  60    Critical care time was exclusive of:  Separately billable procedures and treating other patients and teaching time    Critical care was necessary to treat or prevent imminent or life-threatening deterioration of the following conditions:  Cardiac failure, CNS failure or compromise, respiratory failure and toxidrome    Critical care was time spent personally by me on the following activities:  Blood draw for specimens, obtaining history from patient or surrogate, development of treatment plan with patient or surrogate, discussions with consultants, evaluation of patient's response to treatment, examination of patient, ordering and performing treatments and interventions, ordering and review of laboratory studies, ordering and review of radiographic studies, re-evaluation of patient's condition, review of old charts and interpretation of cardiac output measurements    I assumed direction of critical care for this patient from another provider in my specialty: no    Central Line  Date/Time: 11/25/2018 10:10 AM  Performed by: Ruth Ann Serrato by: Mars Wayne     Patient location:  ED  Consent:     Consent obtained:  Emergent situation  Universal protocol:     Patient identity confirmed: Anonymous protocol, patient vented/unresponsive  Pre-procedure details:     Hand hygiene: Hand hygiene performed prior to insertion      Sterile barrier technique: All elements of maximal sterile technique followed      Skin preparation:  2% chlorhexidine    Skin preparation agent: Skin preparation agent completely dried prior to procedure    Procedure details:     Location:  Left subclavian    Vessel type: vein      Laterality:  Left    Approach: percutaneous technique used      Patient position:  Flat    Catheter type:  Triple lumen 16cm    Catheter size:  7 Fr    Landmarks identified: yes      Ultrasound guidance: no      Number of attempts:  1    Successful placement: yes      Vessel of catheter tip end:  16  Post-procedure details:     Post-procedure:  Line sutured and dressing applied    Assessment:  Blood return through all ports, free fluid flow, no pneumothorax on x-ray and placement verified by x-ray    Patient tolerance of procedure: Tolerated well, no immediate complications           Phone Contacts  ED Phone Contact    ED Course  ED Course as of Nov 26 7621   Sun Nov 25, 2018   0957 D/W Dr Shi Real  of [address] who accepts to his service  1006 Chest x-ray reviewed  Levophed switched to central lumen catheter the  The right tibial IO needle removed  1012 Noted BP  Levophed stopped                                MDM  Number of Diagnoses or Management Options  Diagnosis management comments: ACLS was continued on the patient upon arrival   The patient was given additional Narcan, epinephrine, bicarbonate    The patient had return of spontaneous circulation and was hypotensive  Levophed was started and a central line was placed  Patient was then subsequently admitted to the ICU and a CT of the head was obtained  Please see the nursing code flow sheet for details of the resuscitation       Amount and/or Complexity of Data Reviewed  Clinical lab tests: ordered and reviewed  Tests in the radiology section of CPT®: ordered and reviewed  Tests in the medicine section of CPT®: ordered and reviewed  Review and summarize past medical records: yes  Discuss the patient with other providers: yes  Independent visualization of images, tracings, or specimens: yes      CritCare Time    Disposition  Final diagnoses:   Cardiac arrest ([facility] )   SG (acute kidney injury) ([facility] )   Drug overdose   Acute hypoxemic respiratory failure ([facility] )     Time reflects when diagnosis was documented in both MDM as applicable and the Disposition within this note     Time User Action Codes Description Comment    11/26/2018 10:50 PM Kurtis Slice Add [I46 9] Cardiac arrest ([facility] )     11/26/2018 10:50 PM Kurtis Slice Add [N17 9] SG (acute kidney injury) ([facility] )     11/26/2018 10:50 PM Kurtis Slice Add [T50 901A] Drug overdose     11/26/2018 10:50 PM Kurtis Slice Add [J96 01] Acute hypoxemic respiratory failure [facility])       ED Disposition     ED Disposition Condition Comment    Admit  Case was discussed with Dr Delbert Arizmendi and the patient's admission status was agreed to be Admission Status: inpatient status to the service of Dr Delbert Arizmendi   Follow-up Information    None         There are no discharge medications for this patient  No discharge procedures on file      ED Provider  Electronically Signed by           Jocy Rojas DO  11/26/18 0756

## 2018-11-26 NOTE — UTILIZATION REVIEW
Initial Clinical Review    Admission: Date/Time/Statement: 11/25/18 @ 1011     Orders Placed This Encounter   Procedures    Inpatient Admission (expected length of stay for this patient is greater than two midnights)     Standing Status:   Standing     Number of Occurrences:   1     Order Specific Question:   Admitting Physician     Answer:   Joselito Echevarria     Order Specific Question:   Level of Care     Answer:   Critical Care [15]     Order Specific Question:   Estimated length of stay     Answer:   More than 2 Midnights     Order Specific Question:   Certification     Answer:   I certify that inpatient services are medically necessary for this patient for a duration of greater than two midnights  See H&P and MD Progress Notes for additional information about the patient's course of treatment  ED: Date/Time/Mode of Arrival:   ED Arrival Information     Expected Arrival Acuity Means of Arrival Escorted By Service Admission Type    - 11/25/2018 09:10 Immediate Ambulance [facility] Critical Care/ICU Emergency    Arrival Complaint    Cardiac Arrest           Chief Complaint:   Chief Complaint   Patient presents with    Cardiac Arrest     pt comes in by EMS  on ambu bag compressions ongoing , poss 10 mins downtime after Heroine OD       History of Illness: Korin Hodges is a 50 y o  female who presents with out of hospital cardiac arrest secondary to heroin overdose  She was anticipated to be down prior to any institution of therapy for up to 10 minutes or more  She was given Narcan on seen by police who responded first but without return of circulation  She ultimately had CPR and initiation of epinephrine and other code blue resuscitation medications without return of spontaneous circulation  She was subsequently able to have improvement and restoration of rhythm with emergency department interventions      Further history was not obtainable    It is unclear how long the down time was but the initial rhythm was asystole  Pre CPR down time was in excess of 10 minutes  Restoration of rhythm since the initiation of CPR was believed to be well over 20 minutes      She initially came in on identified    Eventually family was able to be identified and medical history was obtained from a previous chart record       ED Vital Signs:   ED Triage Vitals   Temperature Pulse Respirations Blood Pressure SpO2   11/25/18 1009 11/25/18 0914 11/25/18 0914 11/25/18 0915 11/25/18 1037   (!) 94 8 °F (34 9 °C) 86 (!) 137 (!) 83/50 100 %      Temp src Heart Rate Source Patient Position - Orthostatic VS BP Location FiO2 (%)   -- -- 11/25/18 1006 -- --     Lying        Pain Score       --               Wt Readings from Last 1 Encounters:   11/26/18 60 4 kg (133 lb 2 5 oz)       Vital Signs (abnormal):    above    Abnormal Labs/Diagnostic Test Results:    UDS  Neg  AG  18  Creat   1 48  Albumin   3 0  Ct head:  No acute intracranial abnormality  CXR:  NAD    ED Treatment:   Medication Administration from 11/25/2018 0910 to 11/25/2018 1126       Date/Time Order Dose Route Action Action by Comments     11/25/2018 1009 norepinephrine (LEVOPHED) 4 mg (STANDARD CONCENTRATION) IV in sodium chloride 0 9% 250 mL 0 mcg/min Intravenous Stopped Consuelo Lira RN      11/25/2018 1001 norepinephrine (LEVOPHED) 4 mg (STANDARD CONCENTRATION) IV in sodium chloride 0 9% 250 mL 4 mcg/min Intravenous Rate/Dose Change Luis Enrique Lira RN      11/25/2018 0943 norepinephrine (LEVOPHED) 4 mg (STANDARD CONCENTRATION) IV in sodium chloride 0 9% 250 mL 6 mcg/min Intravenous Rate/Dose Change Consuelo Lira RN      11/25/2018 0930 norepinephrine (LEVOPHED) 4 mg (STANDARD CONCENTRATION) IV in sodium chloride 0 9% 250 mL 4 mcg/min Intravenous [address] Luis Enrique Lira RN      11/25/2018 0911 EPINEPHrine (ADRENALIN) injection 1 mg Intravenous Given JESENIA Davidson     11/25/2018 0916 naloxone (NARCAN) 2 mg/2 mL injection 2 mg Intraosseous Given Ladonna San, RN      11/25/2018 1244 sodium bicarbonate 50 mEq/50 mL injection 50 mEq Intraosseous Given Ladonna San, RN      11/25/2018 0913 sodium bicarbonate 50 mEq/50 mL injection 50 mEq Intraosseous Given Ladonna San, RN      11/25/2018 1103 lactated ringers bolus 1,000 mL 1,000 mL Intravenous Continue to Inpatient Floor Render JESENIA Preston           Past Medical/Surgical History: Active Ambulatory Problems     Diagnosis Date Noted    Rosacea     IBS (irritable bowel syndrome)     Hepatitis C infection     Hallux valgus     LISA (generalized anxiety disorder)     ETOH abuse     Dysmenorrhea     Continuous opioid dependence (HCC)     Bipolar I disorder (HCC)     Benign essential HTN      Resolved Ambulatory Problems     Diagnosis Date Noted    No Resolved Ambulatory Problems     Past Medical History:   Diagnosis Date    Benign essential HTN     Bipolar I disorder (HCC)     Continuous opioid dependence (HCC)     Dysmenorrhea     ETOH abuse     LISA (generalized anxiety disorder)     Hallux valgus     Hepatitis C infection     IBS (irritable bowel syndrome)     Rosacea        Admitting Diagnosis: Cardiac arrest (HCC) [I46 9]    Age/Sex: 50 y o  female    1  Assessment/Plan: Cardiac arrest secondary to drug overdose  · Respiration of rhythm  · Would not proceed with hypothermia protocol given the severe neurologic abnormality at presentation, prolonged and unknown length of down time, and initial presenting rhythm of asystole  2  Acute toxic metabolic encephalopathy secondary to drug overdose and anoxia  · We will follow mental status  · Return to normothermia and avoid hyperthermia  We will not actively rewarm  · Initial head CT was unremarkable  3  Metabolic acidosis, likely secondary to lactate  · Check lactic acid level  · Control blood sugars, not known to be diabetic at baseline  4   Acute respiratory failure with hypoxia  · This is secondary to the cardiac arrest  · Continue ventilator support  5  Benign essential hypertension  · Monitor blood pressure  Initially hypotensive  · Has been on antihypertensives in the past and may need to reinstitute if blood pressure remains elevated  6  Continuous opioid dependence  · History of heroin abuse  · Likely heroin overdose on this presentation  7  Hepatitis C infection  · Chronic infection by history  8  Tobacco abuse  Known to be a smoker up until recent hospitalization in May  9  Bipolar I disorder  · Identified by history       Admission Orders:   IP  11/25  @   1011  Scheduled Meds:   Current Facility-Administered Medications:  acetaminophen 650 mg Per NG Tube Q6H PRN Jose Fontenot MD    cefepime 1,000 mg Intravenous Q12H HENRY Siddiqui    chlorhexidine 15 mL Swish & Spit Q12H [facility] HENRY Florian    heparin (porcine) 5,000 Units Subcutaneous [facility] HENRY Florian    insulin lispro 1-5 Units Subcutaneous Q6H [facility] HENRY Florian    lactated ringers 1,000 mL Intravenous Once Roann Page DO    metroNIDAZOLE 500 mg Intravenous Q8H HENRY Spence    niCARdipine 1-15 mg/hr Intravenous Titrated HENRY Florian Last Rate: 12 5 mg/hr (11/26/18 0927)   sodium chloride 150 mL/hr Intravenous Continuous HENRY Florian Last Rate: 150 mL/hr (11/26/18 0719)     Continuous Infusions:   niCARdipine 1-15 mg/hr Last Rate: 12 5 mg/hr (11/26/18 0927)   sodium chloride 150 mL/hr Last Rate: 150 mL/hr (11/26/18 0719)     PRN Meds:   acetaminophen     Npo  Fall precautions  Vent  Neuro  Checks  Q 2 hrs  BC    PROGRESS  NOTE    11/26  1  Status post cardiac arrest likely secondary to a drug overdose with return of spontaneous circulation  · Will continue with supportive care for now  2  Accelerated hypertension  · Will continue the Cardene infusion for now with a goal to maintain her blood pressure between 140 and 160  · Would monitor her closely for hypotension  3   Probable severe anoxic brain injury  · Given the patient's prolonged downtime and current neurological examination she likely suffered a severe anoxic injury  · Will continue with frequent neuro checks for now  4  Acute respiratory failure with hypoxia secondary to 1    · Will continue with vent support for now  Her mental status will preclude extubation at present  5  Continued opioid dependence with likely heroin overdose contributing to 1  and 3    6  Disposition:  Prognosis for meaningful recovery is extremely poor given her prolonged downtime and current neurological examination  This was discussed with the family  We will continue to monitor her in the ICU for now  [address] [facility] Review Department  Phone: [phone]; Fax [phone]  [email or web address]  ATTENTION: Please call with any questions or concerns to [phone]  and carefully listen to the prompts so that you are directed to the right person  Send all requests for admission clinical reviews, approved or denied determinations and any other requests to fax [phone]   All voicemails are confidential

## 2018-11-26 NOTE — PROGRESS NOTES
Progress Note - Critical Care   Reny Santos 50 y o  female MRN: [id number]  Unit/Bed#: ICU 02 Encounter: [id number]    Assessment/Plan:  1  Status post cardiac arrest likely secondary to a drug overdose with return of spontaneous circulation  · Will continue with supportive care for now  2  Accelerated hypertension  · Will continue the Cardene infusion for now with a goal to maintain her blood pressure between 140 and 160  · Would monitor her closely for hypotension  3  Probable severe anoxic brain injury  · Given the patient's prolonged downtime and current neurological examination she likely suffered a severe anoxic injury  · Will continue with frequent neuro checks for now  4  Acute respiratory failure with hypoxia secondary to 1    · Will continue with vent support for now  Her mental status will preclude extubation at present  5  Continued opioid dependence with likely heroin overdose contributing to 1  and 3    6  Disposition:  Prognosis for meaningful recovery is extremely poor given her prolonged downtime and current neurological examination  This was discussed with the family  We will continue to monitor her in the ICU for now  Critical Care Time:   Documented critical care time excludes any procedures documented elsewhere  It also excludes any family updates    _____________________________________________________________________    HPI/24hr events:    The patient remains on a Cardene infusion for accelerated hypertension    Medications:    Current Facility-Administered Medications:  acetaminophen 650 mg Per NG Tube Q6H PRN Aliyah Flynn MD    chlorhexidine 15 mL Swish & Spit Q12H [address] HENRY Pérez    heparin (porcine) 5,000 Units Subcutaneous [facility] HENRY Pérez    insulin lispro 1-5 Units Subcutaneous Q6H [address] HENRY Pérez    lactated ringers 1,000 mL Intravenous Once Jesús Ann DO    niCARdipine 1-15 mg/hr Intravenous Titrated HENRY Pérez Last Rate: 10 mg/hr (11/26/18 0318)   sodium chloride 150 mL/hr Intravenous Continuous Dewane Speck, CRNP Last Rate: 150 mL/hr (11/26/18 0045)         niCARdipine 1-15 mg/hr Last Rate: 10 mg/hr (11/26/18 0318)   sodium chloride 150 mL/hr Last Rate: 150 mL/hr (11/26/18 0045)         Physical exam:  Vitals: Body mass index is 22 16 kg/m²  Blood pressure 159/88, pulse (!) 124, temperature (!) 102 5 °F (39 2 °C), resp  rate 20, height 5' 5" (1 651 m), weight 60 4 kg (133 lb 2 5 oz), SpO2 100 %  ,  Temp  Min: 94 8 °F (34 9 °C)  Max: 102 5 °F (39 2 °C)  IBW: 57 kg    SpO2: 100 %            Intake/Output Summary (Last 24 hours) at 11/26/18 0350  Last data filed at 11/26/18 0200   Gross per 24 hour   Intake          2400 09 ml   Output             3255 ml   Net          -854 91 ml       Invasive/non-invasive ventilation settings:   Respiratory    Lab Data (Last 4 hours)    None         O2/Vent Data (Last 4 hours)      11/26 0309           Vent Mode AC/VC       Resp Rate (BPM) (BPM) 12       Vt (mL) (mL) 450       FIO2 (%) (%) 50       PEEP (cmH2O) (cmH2O) 5       MV 15                 Invasive Devices     Central Venous Catheter Line            CVC Central Lines 11/25/18 Triple Right Subclavian less than 1 day          Peripheral Intravenous Line            Peripheral IV 11/25/18 Right Hand less than 1 day          Drain            NG/OG/Enteral Tube Orogastric 18 Fr Left mouth less than 1 day    Urethral Catheter Other (Comment) less than 1 day          Airway            ETT  8 mm 1 day                  Physical Exam:  Gen:  Essentially unresponsive  HEENT:  Pupils are on equal   The right pupil is sluggish to respond  The or oropharynx is intubated but otherwise clear  Neck:  Supple negative for lymphadenopathy  Chest:  Essentially clear to auscultation bilaterally  Cor:  Mildly tachycardic but regular  Abd:  Soft and nontender  Ext:  There is no significant edema clubbing or cyanosis  Neuro:  Does not respond to even painful stimuli  She is over breathing the ventilator however she does not have a gag or corneal reflexes intact  Skin:  Warm and dry      Diagnostic Data:  Lab: I have personally reviewed pertinent lab results  CBC:     Results from last 7 days  Lab Units 11/25/18  1458 11/25/18  0931 11/25/18  0920   WBC Thousand/uL  --  9 45  --    HEMOGLOBIN g/dL  --  10 8*  --    I STAT HEMOGLOBIN g/dl  --   --  11 6   HEMATOCRIT %  --  37 2  --    HEMATOCRIT, ISTAT %  --   --  34*   PLATELETS Thousands/uL 283 175  --        CMP:     Results from last 7 days  Lab Units 11/25/18  0931 11/25/18  0920   POTASSIUM mmol/L 4 1  --    CHLORIDE mmol/L 104  --    CO2 mmol/L 22  --    CO2, I-STAT mmol/L  --  18*   BUN mg/dL 12  --    CREATININE mg/dL 1 48*  --    CALCIUM mg/dL 8 7  --    ALK PHOS U/L 81  --    ALT U/L 43  --    AST U/L 41  --    GLUCOSE, ISTAT mg/dl  --  167*     PT/INR:   No results found for: PT, INR,   Magnesium:     Phosphorous:       Microbiology:        Imaging:      Cardiac lab/EKG/telemetry/ECHO:       VTE Prophylaxis:  Heparin    Code Status: Level 1 - Full Code    HENRY Amador    Portions of the record may have been created with voice recognition software  Occasional wrong word or "sound a like" substitutions may have occurred due to the inherent limitations of voice recognition software  Read the chart carefully and recognize, using context, where substitutions have occurred

## 2018-11-27 VITALS
TEMPERATURE: 97.2 F | SYSTOLIC BLOOD PRESSURE: 146 MMHG | OXYGEN SATURATION: 100 % | HEIGHT: 65 IN | BODY MASS INDEX: 22.19 KG/M2 | DIASTOLIC BLOOD PRESSURE: 92 MMHG | WEIGHT: 133.16 LBS

## 2018-11-27 LAB
ATRIAL RATE: 119 BPM
P AXIS: 74 DEGREES
PR INTERVAL: 142 MS
QRS AXIS: 35 DEGREES
QRSD INTERVAL: 76 MS
QT INTERVAL: 336 MS
QTC INTERVAL: 472 MS
T WAVE AXIS: 55 DEGREES
VENTRICULAR RATE: 119 BPM

## 2018-11-27 NOTE — PROGRESS NOTES
Met with daughter and other family members  They have elected to extubate and make comfort care  [facility] was consulted and after speaking to the family, the family still wants palliative extubation now  Will plan for palliative extubation and comfort directed care with IV morphine  Should the patient survive the night, will obtain a hospice consult for transition to hospice

## 2018-11-27 NOTE — DISCHARGE SUMMARY
Discharge Summary - Darian Sarmiento 50 y o  female MRN: [id number]    Unit/Bed#: ICU 02 Encounter: [id number] PCP: Herminia Meraz MD    Admission Date:   Admission Orders     Ordered        11/25/18 1011  Inpatient Admission (expected length of stay for this patient is greater than two midnights)  Once               Admitting Diagnosis: Cardiac arrest ([facility] [address] ) [I46 9]    HPI: 49 y/o female with PMH of HTN, Bipolar disorder, opioid dependence, ETOH abuse, Hepatitis C and IBS  She was found down pulseless, apneic and cyanotic in her home surrounded with drug paraphernalia  Last known normal was 10 min's prior to EMS arrival as per individuals present at the scene  She was given 4 mg of Narcan via nares by the [employer] without any response  ACLS protocol was followed during transport and continued in the ED with estimated ROSC well over 20 min's  She was intubated and admitted to the ICU  Procedures Performed:   Orders Placed This Encounter   Procedures   [facility] ED ECG Documentation Only       Summary of Hospital Course: The patient remained on the ventilator, was hyperthermic and hypertensive, all neurological functions were non existent with the exception of her breathing  She was placed on the cooling blanket for normothermia and started on a Cardene drip  Family was notified of likely poor prognosis due to down time and lack of neurological functions  The patient remained febrile and had an increase in WBC as well as an elevated pro calcitonin was treated with cefepime and flagyl for possible aspiration pneumonia  The patient had a Head CT 24 hours after her initial admission which was consistent with cerebral edema and diffuse anoxic brain injury  The findings were presented to the family who after having an extensive discussion  decided they would like to withdraw care and make the patient comfortable   KRISTINE was contacted as the patient was an organ donor on previous documentation but the family did not wish to procede that route  The patient was given 2 mg of morphine IV, extubated and placed on a morphine drip  She was found to be severely bradycardic and progressed to asystole, she was pronounced at 2230 with family present at bedside  Emotional support was provided to the family  Due to the patient being a drug overdose she will be a coroners case      Significant Findings, Care, Treatment and Services Provided:   CT Head - Diffuse loss of gray-white differentiation and effacement of sulci suggesting diffuse anoxic injury and cerebral edema    Complications: None    Disposition:      Final Diagnosis: Anoxic brain injury     Resolved Problems  Date Reviewed: 2018    None          Condition at Time of Death:

## 2018-11-27 NOTE — DEATH NOTE
INPATIENT DEATH NOTE  Wilber Lopez 50 y o  female MRN: [id number]  Unit/Bed#: ICU 02 Encounter: [id number]         Patient's Information  Pronounced by: HENRY Amaral  Did the patient's death occur in the ED?: No  Did the patient's death occur in the OR?: No  Did the patient's death occur less than 10 days post-op?: No  Did the patient's death occur within 24 hours of admission?: No  Was code status DNR at the time of death?: Yes    PHYSICAL EXAM:  Carotid pulse absent, Heart sounds absent, Pupillary light reflex absent and Corneal blink reflex absent    Medical Examiner notification criteria:  Suspected / documented overdose   Medical Examiner's office notified?:  Yes   Medical Examiner accepted case?:  Yes  Name of Medical Examiner: Maribel Lin    Family Notification  Was the family notified?: Yes  Date Notified: 18  Time Notified: 6388  Notified by: Cheryl Cano, Vanessa [facility]  Name of Family Notified of Death: Gaylon Simmonds (daughter), mother, son in law at bedside    Relationship to Patient: Daughter, Parent, Son-in-law  Family Notification Route:  At bedside  Was the family told to contact a  home?: Yes    Autopsy Options: Coroners Case     Primary Service Attending Physician notified?:  yes - Attending:  Anna Polanco MD    Physician/Resident responsible for completing Discharge Summary:  Nicolás Farmer

## 2018-11-30 LAB
BACTERIA BLD CULT: NORMAL
BACTERIA BLD CULT: NORMAL

## 2018-12-20 NOTE — CMS CERTIFICATION NOTE
Certification: Based upon physical, mental and social evaluations, I certify that inpatient psychiatric services are medically necessary for this patient for a duration of 10 midnights for the treatment of Bipolar I disorder, most recent episode mixed, severe without psychotic features ([facility] [address] )  Available alternative community resources do not meet the patient's mental health care needs  I further attest that an established written individualized plan of care has been implemented and is outlined in the patient's medical records 
yes